# Patient Record
Sex: FEMALE | Race: WHITE | NOT HISPANIC OR LATINO | Employment: FULL TIME | URBAN - METROPOLITAN AREA
[De-identification: names, ages, dates, MRNs, and addresses within clinical notes are randomized per-mention and may not be internally consistent; named-entity substitution may affect disease eponyms.]

---

## 2021-06-28 ENCOUNTER — TELEPHONE (OUTPATIENT)
Dept: SCHEDULING | Facility: IMAGING CENTER | Age: 51
End: 2021-06-28

## 2021-06-30 ENCOUNTER — OFFICE VISIT (OUTPATIENT)
Dept: MEDICAL GROUP | Facility: MEDICAL CENTER | Age: 51
End: 2021-06-30
Payer: COMMERCIAL

## 2021-06-30 VITALS
TEMPERATURE: 98.5 F | OXYGEN SATURATION: 97 % | HEIGHT: 63 IN | WEIGHT: 163.36 LBS | RESPIRATION RATE: 18 BRPM | DIASTOLIC BLOOD PRESSURE: 66 MMHG | BODY MASS INDEX: 28.95 KG/M2 | HEART RATE: 95 BPM | SYSTOLIC BLOOD PRESSURE: 110 MMHG

## 2021-06-30 DIAGNOSIS — N39.3 STRESS INCONTINENCE: ICD-10-CM

## 2021-06-30 DIAGNOSIS — Z13.1 SCREENING FOR DIABETES MELLITUS: ICD-10-CM

## 2021-06-30 DIAGNOSIS — Z12.12 SCREENING FOR COLORECTAL CANCER: ICD-10-CM

## 2021-06-30 DIAGNOSIS — E53.8 FOLATE DEFICIENCY: ICD-10-CM

## 2021-06-30 DIAGNOSIS — Z13.220 ENCOUNTER FOR LIPID SCREENING FOR CARDIOVASCULAR DISEASE: ICD-10-CM

## 2021-06-30 DIAGNOSIS — Z13.21 ENCOUNTER FOR VITAMIN DEFICIENCY SCREENING: ICD-10-CM

## 2021-06-30 DIAGNOSIS — E53.9 VITAMIN B DEFICIENCY: ICD-10-CM

## 2021-06-30 DIAGNOSIS — Z13.6 ENCOUNTER FOR LIPID SCREENING FOR CARDIOVASCULAR DISEASE: ICD-10-CM

## 2021-06-30 DIAGNOSIS — Z12.11 SCREENING FOR COLORECTAL CANCER: ICD-10-CM

## 2021-06-30 DIAGNOSIS — N93.9 VAGINAL BLEEDING: ICD-10-CM

## 2021-06-30 PROCEDURE — 99204 OFFICE O/P NEW MOD 45 MIN: CPT | Performed by: FAMILY MEDICINE

## 2021-06-30 RX ORDER — TRANEXAMIC ACID 650 MG/1
TABLET ORAL
COMMUNITY
End: 2021-07-16

## 2021-06-30 ASSESSMENT — ENCOUNTER SYMPTOMS
COUGH: 0
FEVER: 0
CHILLS: 0
WEIGHT LOSS: 0
BLURRED VISION: 0
BRUISES/BLEEDS EASILY: 0
VOMITING: 0
NAUSEA: 0
ABDOMINAL PAIN: 0
SHORTNESS OF BREATH: 0
WEAKNESS: 0
MYALGIAS: 0
DIARRHEA: 0
DIZZINESS: 0
PALPITATIONS: 0
SORE THROAT: 0
DOUBLE VISION: 0
DEPRESSION: 0
TINGLING: 0
NERVOUS/ANXIOUS: 0
CONSTIPATION: 0

## 2021-06-30 ASSESSMENT — PATIENT HEALTH QUESTIONNAIRE - PHQ9: CLINICAL INTERPRETATION OF PHQ2 SCORE: 0

## 2021-06-30 NOTE — ASSESSMENT & PLAN NOTE
Patient reports that back in 2018 she had an endometrial ablation and had several uterine fibroids removed.  She was prescribed Lysteda at that time to help with uterine bleeding.  She [place] would like to get established with GYN and primary care.  She reports that starting on May 31 she noticed some light spotting that is now progressively worsened since that time, going through 2 pads in 24-hour period.  She denies any episodes of heavy bleeding, dizziness, nausea.  She has made an appointment for herself with a GYN here in town.

## 2021-06-30 NOTE — PROGRESS NOTES
Enriqueta Valdovinos is a pleasant 50 y.o. female here to establish care.    HPI:   Stress incontinence  Patient reports episodes of urinary incontinence when she experiences increase in pressure such as sneezing or coughing.  She states that these do not happen often but would just like to have this information updated in her chart.  She currently does not do Kegel exercises at home.    Vaginal bleeding  Patient reports that back in 2018 she had an endometrial ablation and had several uterine fibroids removed.  She was prescribed Lysteda at that time to help with uterine bleeding.  She [place] would like to get established with GYN and primary care.  She reports that starting on May 31 she noticed some light spotting that is now progressively worsened since that time, going through 2 pads in 24-hour period.  She denies any episodes of heavy bleeding, dizziness, nausea.  She has made an appointment for herself with a GYN here in [facility].     Vitamin B deficiency  She reports vitamin B 12 deficiency and was advised by her previous primary care provider to take vitamin B12 supplementation, folate, and niacin        Health Maintenance  Pap smear: October 2020  Mammogram: October 2020      Current medicines (including changes today)  Current Outpatient Medications   Medication Sig Dispense Refill   • Tranexamic Acid (LYSTEDA) 650 MG Tab Take  by mouth.     • B Complex Vitamins (VITAMIN-B COMPLEX PO) Take  by mouth. Broken down     • Amino Acids (AMINO ACID PO) Take  by mouth.     • VITAMIN D PO Take  by mouth.       No current facility-administered medications for this visit.       Past Medical/ Surgical History  She  has a past medical history of Abnormal Pap smear of vagina.  She  has a past surgical history that includes endometrial ablation (2018).    Social History  Social History     Tobacco Use   • Smoking status: Never Smoker   • Smokeless tobacco: Never Used   Vaping Use   • Vaping  "Use: Never used   Substance Use Topics   • Alcohol use: Yes     Comment: rare   • Drug use: Not Currently     Social History     Social History Narrative   • Not on file        Family History  Family History   Problem Relation Age of Onset   • Anxiety disorder Mother    • Cancer Father         bladder, hx of smoking   • No Known Problems Sister    • Obesity Daughter      Family Status   Relation Name Status   • Mo  Alive   • Fa     • Delicia Clark Alive   • Juany Byrd Alive         Review of Systems   Constitutional: Negative for chills, fever, malaise/fatigue and weight loss.   HENT: Negative for hearing loss and sore throat.    Eyes: Negative for blurred vision and double vision.   Respiratory: Negative for cough and shortness of breath.    Cardiovascular: Negative for chest pain, palpitations and leg swelling.   Gastrointestinal: Negative for abdominal pain, constipation, diarrhea, nausea and vomiting.   Musculoskeletal: Negative for myalgias.   Skin: Negative for rash.   Neurological: Negative for dizziness, tingling and weakness.   Endo/Heme/Allergies: Does not bruise/bleed easily.   Psychiatric/Behavioral: Negative for depression. The patient is not nervous/anxious.          Objective:     /66 (BP Location: Right arm, Patient Position: Sitting, BP Cuff Size: Adult)   Pulse 95   Temp 36.9 °C (98.5 °F) (Temporal)   Resp 18   Ht 1.6 m (5' 3\")   Wt 74.1 kg (163 lb 5.8 oz)   SpO2 97%  Body mass index is 28.94 kg/m².    Physical Exam  Constitutional:       General: She is not in acute distress.  HENT:      Head: Normocephalic and atraumatic.      Right Ear: External ear normal.      Left Ear: External ear normal.   Eyes:      Conjunctiva/sclera: Conjunctivae normal.      Pupils: Pupils are equal, round, and reactive to light.   Cardiovascular:      Rate and Rhythm: Normal rate and regular rhythm.      Heart sounds: No murmur heard.   No friction rub. No gallop.    Pulmonary:      Effort: Pulmonary " effort is normal.      Breath sounds: Normal breath sounds. No wheezing or rales.   Abdominal:      General: Bowel sounds are normal.      Palpations: Abdomen is soft.      Tenderness: There is no abdominal tenderness.   Musculoskeletal:      Cervical back: Normal range of motion and neck supple.      Right lower leg: No edema.      Left lower leg: No edema.   Skin:     General: Skin is warm and dry.      Findings: No rash.   Neurological:      Mental Status: She is alert and oriented to person, place, and time.      Gait: Gait is intact.   Psychiatric:         Mood and Affect: Affect normal.        Imaging:  No recent imaging to review    Labs:  No recent blood work to review  Assessment and Plan:   The following treatment plan was discussed    1. Vaginal bleeding  New to the patient.  We will go ahead and obtain a CBC to check patient's blood levels as well as clotting factors.  We will also get a pelvic ultrasound along with a referral to GYN for further evaluation of her abnormal uterine bleeding.  - REFERRAL TO GYNECOLOGY  - US-PELVIC COMPLETE (TRANSABDOMINAL/TRANSVAGINAL) (COMBO); Future  - CBC WITH DIFFERENTIAL; Future    2. Stress incontinence  Chronic.  I did recommend that the patient complete Kegel exercises to help strengthen her pelvic floor.    3. Vitamin B deficiency  Patient reports a history of vitamin B12 deficiency, she would like to have blood testing to see what her levels are.  - VITAMIN B12; Future    4. Folate deficiency  Patient reports a history of folate deficiency, she would like to have blood testing to see what her levels are.  - FOLATE; Future    5. Screening for colorectal cancer  Patient is due for her colorectal cancer screening.  Referral placed  - REFERRAL TO GI FOR COLONOSCOPY    6. Encounter for vitamin deficiency screening  Patient would like to have their blood work done, no recent blood work completed.  Orders placed.  - VITAMIN D,25 HYDROXY; Future    7. Encounter for lipid  screening for cardiovascular disease  Patient would like to have their blood work done, no recent blood work completed.  Orders placed.  - Lipid Profile; Future    8. Screening for diabetes mellitus  Patient would like to have their blood work done, no recent blood work completed.  Orders placed.  - Comp Metabolic Panel; Future  - ESTIMATED GFR; Future      Records requested.  Followup: Return in about 4 weeks (around 7/28/2021) for blood work.    Please note that this dictation was created using voice recognition software. I have made every reasonable attempt to correct obvious errors, but I expect that there are errors of grammar and possibly content that I did not discover before finalizing the note.

## 2021-06-30 NOTE — ASSESSMENT & PLAN NOTE
Patient reports episodes of urinary incontinence when she experiences increase in pressure such as sneezing or coughing.  She states that these do not happen often but would just like to have this information updated in her chart.  She currently does not do Kegel exercises at home.

## 2021-06-30 NOTE — ASSESSMENT & PLAN NOTE
She reports vitamin B 12 deficiency and was advised by her previous primary care provider to take vitamin B12 supplementation, folate, and niacin

## 2021-07-06 ENCOUNTER — HOSPITAL ENCOUNTER (OUTPATIENT)
Dept: LAB | Facility: MEDICAL CENTER | Age: 51
End: 2021-07-06
Attending: FAMILY MEDICINE
Payer: COMMERCIAL

## 2021-07-06 DIAGNOSIS — Z13.220 ENCOUNTER FOR LIPID SCREENING FOR CARDIOVASCULAR DISEASE: ICD-10-CM

## 2021-07-06 DIAGNOSIS — Z13.21 ENCOUNTER FOR VITAMIN DEFICIENCY SCREENING: ICD-10-CM

## 2021-07-06 DIAGNOSIS — Z13.1 SCREENING FOR DIABETES MELLITUS: ICD-10-CM

## 2021-07-06 DIAGNOSIS — E53.9 VITAMIN B DEFICIENCY: ICD-10-CM

## 2021-07-06 DIAGNOSIS — Z13.6 ENCOUNTER FOR LIPID SCREENING FOR CARDIOVASCULAR DISEASE: ICD-10-CM

## 2021-07-06 DIAGNOSIS — N93.9 VAGINAL BLEEDING: ICD-10-CM

## 2021-07-06 DIAGNOSIS — E53.8 FOLATE DEFICIENCY: ICD-10-CM

## 2021-07-06 LAB
25(OH)D3 SERPL-MCNC: 41 NG/ML (ref 30–100)
ALBUMIN SERPL BCP-MCNC: 4.2 G/DL (ref 3.2–4.9)
ALBUMIN/GLOB SERPL: 1.6 G/DL
ALP SERPL-CCNC: 69 U/L (ref 30–99)
ALT SERPL-CCNC: 19 U/L (ref 2–50)
ANION GAP SERPL CALC-SCNC: 8 MMOL/L (ref 7–16)
AST SERPL-CCNC: 22 U/L (ref 12–45)
BASOPHILS # BLD AUTO: 0.8 % (ref 0–1.8)
BASOPHILS # BLD: 0.04 K/UL (ref 0–0.12)
BILIRUB SERPL-MCNC: 0.6 MG/DL (ref 0.1–1.5)
BUN SERPL-MCNC: 11 MG/DL (ref 8–22)
CALCIUM SERPL-MCNC: 9 MG/DL (ref 8.4–10.2)
CHLORIDE SERPL-SCNC: 102 MMOL/L (ref 96–112)
CHOLEST SERPL-MCNC: 196 MG/DL (ref 100–199)
CO2 SERPL-SCNC: 27 MMOL/L (ref 20–33)
CREAT SERPL-MCNC: 0.83 MG/DL (ref 0.5–1.4)
EOSINOPHIL # BLD AUTO: 0.21 K/UL (ref 0–0.51)
EOSINOPHIL NFR BLD: 4.3 % (ref 0–6.9)
ERYTHROCYTE [DISTWIDTH] IN BLOOD BY AUTOMATED COUNT: 39.3 FL (ref 35.9–50)
FASTING STATUS PATIENT QL REPORTED: NORMAL
FOLATE SERPL-MCNC: 18.6 NG/ML
GLOBULIN SER CALC-MCNC: 2.7 G/DL (ref 1.9–3.5)
GLUCOSE SERPL-MCNC: 103 MG/DL (ref 65–99)
HCT VFR BLD AUTO: 40.7 % (ref 37–47)
HDLC SERPL-MCNC: 53 MG/DL
HGB BLD-MCNC: 14.2 G/DL (ref 12–16)
IMM GRANULOCYTES # BLD AUTO: 0.01 K/UL (ref 0–0.11)
IMM GRANULOCYTES NFR BLD AUTO: 0.2 % (ref 0–0.9)
LDLC SERPL CALC-MCNC: 94 MG/DL
LYMPHOCYTES # BLD AUTO: 1.5 K/UL (ref 1–4.8)
LYMPHOCYTES NFR BLD: 30.4 % (ref 22–41)
MCH RBC QN AUTO: 31.3 PG (ref 27–33)
MCHC RBC AUTO-ENTMCNC: 34.9 G/DL (ref 33.6–35)
MCV RBC AUTO: 89.8 FL (ref 81.4–97.8)
MONOCYTES # BLD AUTO: 0.34 K/UL (ref 0–0.85)
MONOCYTES NFR BLD AUTO: 6.9 % (ref 0–13.4)
NEUTROPHILS # BLD AUTO: 2.84 K/UL (ref 2–7.15)
NEUTROPHILS NFR BLD: 57.4 % (ref 44–72)
NRBC # BLD AUTO: 0 K/UL
NRBC BLD-RTO: 0 /100 WBC
PLATELET # BLD AUTO: 271 K/UL (ref 164–446)
PMV BLD AUTO: 9.4 FL (ref 9–12.9)
POTASSIUM SERPL-SCNC: 4.2 MMOL/L (ref 3.6–5.5)
PROT SERPL-MCNC: 6.9 G/DL (ref 6–8.2)
RBC # BLD AUTO: 4.53 M/UL (ref 4.2–5.4)
SODIUM SERPL-SCNC: 137 MMOL/L (ref 135–145)
TRIGL SERPL-MCNC: 243 MG/DL (ref 0–149)
VIT B12 SERPL-MCNC: 837 PG/ML (ref 211–911)
WBC # BLD AUTO: 4.9 K/UL (ref 4.8–10.8)

## 2021-07-06 PROCEDURE — 85025 COMPLETE CBC W/AUTO DIFF WBC: CPT

## 2021-07-06 PROCEDURE — 82607 VITAMIN B-12: CPT

## 2021-07-06 PROCEDURE — 80053 COMPREHEN METABOLIC PANEL: CPT

## 2021-07-06 PROCEDURE — 82306 VITAMIN D 25 HYDROXY: CPT

## 2021-07-06 PROCEDURE — 36415 COLL VENOUS BLD VENIPUNCTURE: CPT

## 2021-07-06 PROCEDURE — 82746 ASSAY OF FOLIC ACID SERUM: CPT

## 2021-07-06 PROCEDURE — 80061 LIPID PANEL: CPT

## 2021-07-07 ENCOUNTER — APPOINTMENT (OUTPATIENT)
Dept: RADIOLOGY | Facility: MEDICAL CENTER | Age: 51
End: 2021-07-07
Attending: FAMILY MEDICINE
Payer: COMMERCIAL

## 2021-07-16 ENCOUNTER — OFFICE VISIT (OUTPATIENT)
Dept: MEDICAL GROUP | Facility: PHYSICIAN GROUP | Age: 51
End: 2021-07-16
Payer: COMMERCIAL

## 2021-07-16 VITALS
OXYGEN SATURATION: 97 % | HEIGHT: 63 IN | WEIGHT: 160 LBS | HEART RATE: 85 BPM | TEMPERATURE: 98.2 F | BODY MASS INDEX: 28.35 KG/M2 | SYSTOLIC BLOOD PRESSURE: 114 MMHG | DIASTOLIC BLOOD PRESSURE: 72 MMHG

## 2021-07-16 DIAGNOSIS — G47.33 OSA (OBSTRUCTIVE SLEEP APNEA): ICD-10-CM

## 2021-07-16 DIAGNOSIS — R73.01 ELEVATED FASTING GLUCOSE: ICD-10-CM

## 2021-07-16 DIAGNOSIS — E78.1 HYPERTRIGLYCERIDEMIA: ICD-10-CM

## 2021-07-16 DIAGNOSIS — N93.9 VAGINAL BLEEDING: ICD-10-CM

## 2021-07-16 DIAGNOSIS — E53.8 FOLATE DEFICIENCY: ICD-10-CM

## 2021-07-16 DIAGNOSIS — Z86.32 HISTORY OF GESTATIONAL DIABETES: ICD-10-CM

## 2021-07-16 PROBLEM — K90.49 MILK PROTEIN INTOLERANCE: Status: ACTIVE | Noted: 2021-07-16

## 2021-07-16 PROBLEM — E55.9 VITAMIN D DEFICIENCY: Status: ACTIVE | Noted: 2020-07-20

## 2021-07-16 PROBLEM — Z15.89 HETEROZYGOUS MTHFR MUTATION C677T: Status: ACTIVE | Noted: 2020-07-23

## 2021-07-16 PROCEDURE — 99214 OFFICE O/P EST MOD 30 MIN: CPT | Performed by: FAMILY MEDICINE

## 2021-07-16 ASSESSMENT — FIBROSIS 4 INDEX: FIB4 SCORE: 0.95

## 2021-07-16 NOTE — PROGRESS NOTES
CC: Lab review    HISTORY OF THE PRESENT ILLNESS: Patient is a 51 y.o. female.     Patient is here to establish care.    She notes that she recently had lab work done and would like to review her lab results.  Overall everything looked excellent with the exception of elevated triglycerides and mildly elevated fasting sugar.    She does note that prior to having her labs completed, she did partake in foods and drink that she normally does not such as soda and alcohol, as it was her birthday and Fourth of July weekend.    She does note history of elevated fasting sugar as well in the past, and she does have a history of gestational diabetes.    She notes she is awaiting a referral to gynecology.  She has a history of heavy vaginal bleeding and abnormal Pap smears.  She also has a history of endometrial ablation in 2018.    She notes history of obstructive sleep apnea though she did not tolerate CPAP machine.  She has since lost weight and feels rested when she wakes up in the morning.  Denies any headaches upon awakening.    Allergies: Patient has no known allergies.    Current Outpatient Medications Ordered in Epic   Medication Sig Dispense Refill   • B Complex Vitamins (VITAMIN-B COMPLEX PO) Take  by mouth. Broken down     • Amino Acids (AMINO ACID PO) Take  by mouth.     • VITAMIN D PO Take  by mouth.       No current Epic-ordered facility-administered medications on file.       Past Medical History:   Diagnosis Date   • Abnormal Pap smear of vagina        Past Surgical History:   Procedure Laterality Date   • ENDOMETRIAL ABLATION  2018       Social History     Tobacco Use   • Smoking status: Never Smoker   • Smokeless tobacco: Never Used   Vaping Use   • Vaping Use: Never used   Substance Use Topics   • Alcohol use: Yes     Comment: rare   • Drug use: Not Currently       Social History     Social History Narrative   • Not on file       Family History   Problem Relation Age of Onset   • Anxiety disorder Mother    •  "Other Mother         PAD with stent placement   • Cancer Father         bladder, hx of smoking   • Hyperlipidemia Sister    • Obesity Daughter        ROS:   Denies fever, chest pain, shortness of breath.  Denies bowel or bladder concerns.      Labs: Labs reviewed from July 6, 2021.  Outside labs also reviewed from prior physician (see scanned under media on today's date).    Exam: /72 (BP Location: Right arm, Patient Position: Sitting, BP Cuff Size: Adult)   Pulse 85   Temp 36.8 °C (98.2 °F) (Temporal)   Ht 1.6 m (5' 3\")   Wt 72.6 kg (160 lb)   SpO2 97%  Body mass index is 28.34 kg/m².    General: Well appearing, NAD  HEENT: Normocephalic. Conjunctiva clear, lids without ptosis, pupils equal and reactive to light accommodation, ears normal shape and contour, canals are clear bilaterally, tympanic membranes without bulging or erythema and normal cone of light  Neck: Supple without JVD. No thyromegaly or nodules  Pulmonary: Clear to ausculation.  Normal effort. No rales, ronchi, or wheezing.  Cardiovascular: Regular rate and rhythm without murmur, rubs or gallop.   Abdomen: Soft, nontender, nondistended. Normal bowel sounds. Liver and spleen are not palpable. No rebound or guarding  Neurologic: normal gait  Lymph: No cervical, supraclavicular  lymph nodes are palpable  Skin: Warm and dry.  No obvious lesions.  Musculoskeletal:  No extremity cyanosis, clubbing, or edema.  Psych: Normal mood and affect. Alert and oriented. Judgment and insight is normal.    Please note that this dictation was created using voice recognition software. I have made every reasonable attempt to correct obvious errors, but I expect that there are errors of grammar and possibly content that I did not discover before finalizing the note.      Assessment/Plan  Enriqueta was seen today for establish care and lab results.    Diagnoses and all orders for this visit:    Vaginal bleeding  As above, awaiting consultation from gynecology.  " Recent CBC did not show anemia.    Folate deficiency  Folate on most recent lab work was within normal limits.  Continue current supplementation.    Elevated fasting glucose  History of gestational diabetes  Recent fasting glucose 103, mild.  Recommend hemoglobin A1c check at least once yearly.    LOUIS (obstructive sleep apnea)  Since this diagnosis, patient has lost weight.  She also notes that she wakes up feeling well rested.  She did not tolerate CPAP.    Hypertriglyceridemia  Noted on recent lab work though LDL, HDL and total cholesterol within normal limits.  Likely secondary to dietary intake on days leading up to lab work.  Will monitor.    Follow-up in 1 year sooner if needed.    Haydee Cortés,   [facility]

## 2021-08-04 ENCOUNTER — HOSPITAL ENCOUNTER (OUTPATIENT)
Dept: RADIOLOGY | Facility: MEDICAL CENTER | Age: 51
End: 2021-08-04
Attending: FAMILY MEDICINE
Payer: COMMERCIAL

## 2021-08-04 DIAGNOSIS — N93.9 VAGINAL BLEEDING: ICD-10-CM

## 2021-08-04 PROCEDURE — 76830 TRANSVAGINAL US NON-OB: CPT

## 2021-10-22 ENCOUNTER — HOSPITAL ENCOUNTER (OUTPATIENT)
Dept: LAB | Facility: MEDICAL CENTER | Age: 51
End: 2021-10-22
Attending: FAMILY MEDICINE
Payer: COMMERCIAL

## 2021-10-22 ENCOUNTER — HOSPITAL ENCOUNTER (OUTPATIENT)
Dept: LAB | Facility: MEDICAL CENTER | Age: 51
End: 2021-10-22
Attending: OBSTETRICS & GYNECOLOGY
Payer: COMMERCIAL

## 2021-10-22 ENCOUNTER — TELEPHONE (OUTPATIENT)
Dept: MEDICAL GROUP | Facility: PHYSICIAN GROUP | Age: 51
End: 2021-10-22

## 2021-10-22 DIAGNOSIS — N92.4 EXCESSIVE BLEEDING IN PREMENOPAUSAL PERIOD: ICD-10-CM

## 2021-10-22 LAB
BASOPHILS # BLD AUTO: 0.5 % (ref 0–1.8)
BASOPHILS # BLD: 0.03 K/UL (ref 0–0.12)
EOSINOPHIL # BLD AUTO: 0.2 K/UL (ref 0–0.51)
EOSINOPHIL NFR BLD: 3.6 % (ref 0–6.9)
ERYTHROCYTE [DISTWIDTH] IN BLOOD BY AUTOMATED COUNT: 39.1 FL (ref 35.9–50)
FERRITIN SERPL-MCNC: 180 NG/ML (ref 10–291)
HCT VFR BLD AUTO: 45.1 % (ref 37–47)
HGB BLD-MCNC: 15.4 G/DL (ref 12–16)
IMM GRANULOCYTES # BLD AUTO: 0.01 K/UL (ref 0–0.11)
IMM GRANULOCYTES NFR BLD AUTO: 0.2 % (ref 0–0.9)
IRON SATN MFR SERPL: 22 % (ref 15–55)
IRON SERPL-MCNC: 68 UG/DL (ref 40–170)
LYMPHOCYTES # BLD AUTO: 1.38 K/UL (ref 1–4.8)
LYMPHOCYTES NFR BLD: 25.1 % (ref 22–41)
MCH RBC QN AUTO: 30.6 PG (ref 27–33)
MCHC RBC AUTO-ENTMCNC: 34.1 G/DL (ref 33.6–35)
MCV RBC AUTO: 89.5 FL (ref 81.4–97.8)
MONOCYTES # BLD AUTO: 0.36 K/UL (ref 0–0.85)
MONOCYTES NFR BLD AUTO: 6.6 % (ref 0–13.4)
NEUTROPHILS # BLD AUTO: 3.51 K/UL (ref 2–7.15)
NEUTROPHILS NFR BLD: 64 % (ref 44–72)
NRBC # BLD AUTO: 0 K/UL
NRBC BLD-RTO: 0 /100 WBC
PLATELET # BLD AUTO: 266 K/UL (ref 164–446)
PMV BLD AUTO: 9.2 FL (ref 9–12.9)
RBC # BLD AUTO: 5.04 M/UL (ref 4.2–5.4)
TIBC SERPL-MCNC: 304 UG/DL (ref 250–450)
UIBC SERPL-MCNC: 236 UG/DL (ref 110–370)
WBC # BLD AUTO: 5.5 K/UL (ref 4.8–10.8)

## 2021-10-22 PROCEDURE — 83540 ASSAY OF IRON: CPT

## 2021-10-22 PROCEDURE — 85025 COMPLETE CBC W/AUTO DIFF WBC: CPT

## 2021-10-22 PROCEDURE — 36415 COLL VENOUS BLD VENIPUNCTURE: CPT

## 2021-10-22 PROCEDURE — 82728 ASSAY OF FERRITIN: CPT

## 2021-10-22 PROCEDURE — 83001 ASSAY OF GONADOTROPIN (FSH): CPT

## 2021-10-22 PROCEDURE — 83550 IRON BINDING TEST: CPT

## 2021-10-23 LAB — FSH SERPL-ACNC: 62.1 MIU/ML

## 2022-07-26 ENCOUNTER — TELEPHONE (OUTPATIENT)
Dept: HEALTH INFORMATION MANAGEMENT | Facility: OTHER | Age: 52
End: 2022-07-26

## 2022-07-26 PROBLEM — Z00.00 PREVENTATIVE HEALTH CARE: Status: ACTIVE | Noted: 2022-07-26

## 2022-07-26 SDOH — ECONOMIC STABILITY: TRANSPORTATION INSECURITY
IN THE PAST 12 MONTHS, HAS THE LACK OF TRANSPORTATION KEPT YOU FROM MEDICAL APPOINTMENTS OR FROM GETTING MEDICATIONS?: PATIENT DECLINED

## 2022-07-26 SDOH — HEALTH STABILITY: PHYSICAL HEALTH: ON AVERAGE, HOW MANY MINUTES DO YOU ENGAGE IN EXERCISE AT THIS LEVEL?: 150+ MIN

## 2022-07-26 SDOH — ECONOMIC STABILITY: FOOD INSECURITY: WITHIN THE PAST 12 MONTHS, THE FOOD YOU BOUGHT JUST DIDN'T LAST AND YOU DIDN'T HAVE MONEY TO GET MORE.: PATIENT DECLINED

## 2022-07-26 SDOH — ECONOMIC STABILITY: FOOD INSECURITY: WITHIN THE PAST 12 MONTHS, YOU WORRIED THAT YOUR FOOD WOULD RUN OUT BEFORE YOU GOT MONEY TO BUY MORE.: PATIENT DECLINED

## 2022-07-26 SDOH — HEALTH STABILITY: PHYSICAL HEALTH: ON AVERAGE, HOW MANY DAYS PER WEEK DO YOU ENGAGE IN MODERATE TO STRENUOUS EXERCISE (LIKE A BRISK WALK)?: 1 DAY

## 2022-07-26 SDOH — ECONOMIC STABILITY: INCOME INSECURITY: IN THE LAST 12 MONTHS, WAS THERE A TIME WHEN YOU WERE NOT ABLE TO PAY THE MORTGAGE OR RENT ON TIME?: PATIENT REFUSED

## 2022-07-26 SDOH — ECONOMIC STABILITY: HOUSING INSECURITY
IN THE LAST 12 MONTHS, WAS THERE A TIME WHEN YOU DID NOT HAVE A STEADY PLACE TO SLEEP OR SLEPT IN A SHELTER (INCLUDING NOW)?: PATIENT REFUSED

## 2022-07-26 SDOH — ECONOMIC STABILITY: INCOME INSECURITY: HOW HARD IS IT FOR YOU TO PAY FOR THE VERY BASICS LIKE FOOD, HOUSING, MEDICAL CARE, AND HEATING?: PATIENT DECLINED

## 2022-07-26 SDOH — ECONOMIC STABILITY: TRANSPORTATION INSECURITY
IN THE PAST 12 MONTHS, HAS LACK OF RELIABLE TRANSPORTATION KEPT YOU FROM MEDICAL APPOINTMENTS, MEETINGS, WORK OR FROM GETTING THINGS NEEDED FOR DAILY LIVING?: PATIENT DECLINED

## 2022-07-26 SDOH — ECONOMIC STABILITY: HOUSING INSECURITY

## 2022-07-26 SDOH — HEALTH STABILITY: MENTAL HEALTH
STRESS IS WHEN SOMEONE FEELS TENSE, NERVOUS, ANXIOUS, OR CAN'T SLEEP AT NIGHT BECAUSE THEIR MIND IS TROUBLED. HOW STRESSED ARE YOU?: NOT AT ALL

## 2022-07-26 SDOH — ECONOMIC STABILITY: TRANSPORTATION INSECURITY
IN THE PAST 12 MONTHS, HAS LACK OF TRANSPORTATION KEPT YOU FROM MEETINGS, WORK, OR FROM GETTING THINGS NEEDED FOR DAILY LIVING?: PATIENT DECLINED

## 2022-07-26 ASSESSMENT — LIFESTYLE VARIABLES
HOW OFTEN DO YOU HAVE A DRINK CONTAINING ALCOHOL: PATIENT DECLINED
AUDIT-C TOTAL SCORE: -1
HOW MANY STANDARD DRINKS CONTAINING ALCOHOL DO YOU HAVE ON A TYPICAL DAY: PATIENT DECLINED
HOW OFTEN DO YOU HAVE SIX OR MORE DRINKS ON ONE OCCASION: PATIENT DECLINED
SKIP TO QUESTIONS 9-10: 0

## 2022-07-26 ASSESSMENT — SOCIAL DETERMINANTS OF HEALTH (SDOH)
ARE YOU MARRIED, WIDOWED, DIVORCED, SEPARATED, NEVER MARRIED, OR LIVING WITH A PARTNER?: PATIENT DECLINED
DO YOU BELONG TO ANY CLUBS OR ORGANIZATIONS SUCH AS CHURCH GROUPS UNIONS, FRATERNAL OR ATHLETIC GROUPS, OR SCHOOL GROUPS?: PATIENT DECLINED
HOW MANY DRINKS CONTAINING ALCOHOL DO YOU HAVE ON A TYPICAL DAY WHEN YOU ARE DRINKING: PATIENT DECLINED
IN A TYPICAL WEEK, HOW MANY TIMES DO YOU TALK ON THE PHONE WITH FAMILY, FRIENDS, OR NEIGHBORS?: PATIENT DECLINED
HOW OFTEN DO YOU ATTEND CHURCH OR RELIGIOUS SERVICES?: PATIENT DECLINED
WITHIN THE PAST 12 MONTHS, YOU WORRIED THAT YOUR FOOD WOULD RUN OUT BEFORE YOU GOT THE MONEY TO BUY MORE: PATIENT DECLINED
HOW OFTEN DO YOU ATTENT MEETINGS OF THE CLUB OR ORGANIZATION YOU BELONG TO?: PATIENT DECLINED
DO YOU BELONG TO ANY CLUBS OR ORGANIZATIONS SUCH AS CHURCH GROUPS UNIONS, FRATERNAL OR ATHLETIC GROUPS, OR SCHOOL GROUPS?: PATIENT DECLINED
HOW OFTEN DO YOU HAVE A DRINK CONTAINING ALCOHOL: PATIENT DECLINED
HOW OFTEN DO YOU HAVE SIX OR MORE DRINKS ON ONE OCCASION: PATIENT DECLINED
IN A TYPICAL WEEK, HOW MANY TIMES DO YOU TALK ON THE PHONE WITH FAMILY, FRIENDS, OR NEIGHBORS?: PATIENT DECLINED
HOW OFTEN DO YOU ATTENT MEETINGS OF THE CLUB OR ORGANIZATION YOU BELONG TO?: PATIENT DECLINED
ARE YOU MARRIED, WIDOWED, DIVORCED, SEPARATED, NEVER MARRIED, OR LIVING WITH A PARTNER?: PATIENT DECLINED
HOW OFTEN DO YOU GET TOGETHER WITH FRIENDS OR RELATIVES?: PATIENT DECLINED
HOW OFTEN DO YOU GET TOGETHER WITH FRIENDS OR RELATIVES?: PATIENT DECLINED
HOW HARD IS IT FOR YOU TO PAY FOR THE VERY BASICS LIKE FOOD, HOUSING, MEDICAL CARE, AND HEATING?: PATIENT DECLINED
HOW OFTEN DO YOU ATTEND CHURCH OR RELIGIOUS SERVICES?: PATIENT DECLINED

## 2022-07-27 ENCOUNTER — HOSPITAL ENCOUNTER (OUTPATIENT)
Dept: LAB | Facility: MEDICAL CENTER | Age: 52
End: 2022-07-27
Attending: STUDENT IN AN ORGANIZED HEALTH CARE EDUCATION/TRAINING PROGRAM
Payer: COMMERCIAL

## 2022-07-27 ENCOUNTER — OFFICE VISIT (OUTPATIENT)
Dept: MEDICAL GROUP | Facility: MEDICAL CENTER | Age: 52
End: 2022-07-27
Payer: COMMERCIAL

## 2022-07-27 ENCOUNTER — RESEARCH ENCOUNTER (OUTPATIENT)
Dept: MEDICAL GROUP | Facility: PHYSICIAN GROUP | Age: 52
End: 2022-07-27

## 2022-07-27 VITALS
RESPIRATION RATE: 16 BRPM | OXYGEN SATURATION: 98 % | HEIGHT: 63 IN | TEMPERATURE: 97.9 F | DIASTOLIC BLOOD PRESSURE: 68 MMHG | WEIGHT: 170.4 LBS | HEART RATE: 78 BPM | BODY MASS INDEX: 30.19 KG/M2 | SYSTOLIC BLOOD PRESSURE: 122 MMHG

## 2022-07-27 DIAGNOSIS — E55.9 VITAMIN D DEFICIENCY: ICD-10-CM

## 2022-07-27 DIAGNOSIS — Z00.00 PREVENTATIVE HEALTH CARE: ICD-10-CM

## 2022-07-27 DIAGNOSIS — E53.9 VITAMIN B DEFICIENCY: ICD-10-CM

## 2022-07-27 DIAGNOSIS — R19.8 ALTERNATING CONSTIPATION AND DIARRHEA: ICD-10-CM

## 2022-07-27 DIAGNOSIS — Z11.59 NEED FOR HEPATITIS C SCREENING TEST: ICD-10-CM

## 2022-07-27 DIAGNOSIS — Z00.6 RESEARCH STUDY PATIENT: ICD-10-CM

## 2022-07-27 DIAGNOSIS — Z11.4 SCREENING FOR HIV (HUMAN IMMUNODEFICIENCY VIRUS): ICD-10-CM

## 2022-07-27 DIAGNOSIS — E53.8 FOLATE DEFICIENCY: ICD-10-CM

## 2022-07-27 DIAGNOSIS — Z15.89 HETEROZYGOUS MTHFR MUTATION C677T: ICD-10-CM

## 2022-07-27 DIAGNOSIS — E78.1 HYPERTRIGLYCERIDEMIA: ICD-10-CM

## 2022-07-27 DIAGNOSIS — R68.82 DECREASED LIBIDO: ICD-10-CM

## 2022-07-27 DIAGNOSIS — Z76.89 ENCOUNTER TO ESTABLISH CARE WITH NEW DOCTOR: ICD-10-CM

## 2022-07-27 DIAGNOSIS — Z12.11 COLON CANCER SCREENING: ICD-10-CM

## 2022-07-27 LAB
25(OH)D3 SERPL-MCNC: 55 NG/ML (ref 30–100)
ALBUMIN SERPL BCP-MCNC: 4.8 G/DL (ref 3.2–4.9)
ALBUMIN/GLOB SERPL: 1.8 G/DL
ALP SERPL-CCNC: 73 U/L (ref 30–99)
ALT SERPL-CCNC: 37 U/L (ref 2–50)
ANION GAP SERPL CALC-SCNC: 12 MMOL/L (ref 7–16)
AST SERPL-CCNC: 29 U/L (ref 12–45)
BASOPHILS # BLD AUTO: 0.6 % (ref 0–1.8)
BASOPHILS # BLD: 0.03 K/UL (ref 0–0.12)
BILIRUB SERPL-MCNC: 0.8 MG/DL (ref 0.1–1.5)
BUN SERPL-MCNC: 16 MG/DL (ref 8–22)
CALCIUM SERPL-MCNC: 9.2 MG/DL (ref 8.4–10.2)
CHLORIDE SERPL-SCNC: 100 MMOL/L (ref 96–112)
CHOLEST SERPL-MCNC: 200 MG/DL (ref 100–199)
CO2 SERPL-SCNC: 26 MMOL/L (ref 20–33)
CREAT SERPL-MCNC: 0.77 MG/DL (ref 0.5–1.4)
EOSINOPHIL # BLD AUTO: 0.13 K/UL (ref 0–0.51)
EOSINOPHIL NFR BLD: 2.7 % (ref 0–6.9)
ERYTHROCYTE [DISTWIDTH] IN BLOOD BY AUTOMATED COUNT: 39.4 FL (ref 35.9–50)
EST. AVERAGE GLUCOSE BLD GHB EST-MCNC: 85 MG/DL
FASTING STATUS PATIENT QL REPORTED: NORMAL
GFR SERPLBLD CREATININE-BSD FMLA CKD-EPI: 93 ML/MIN/1.73 M 2
GLOBULIN SER CALC-MCNC: 2.6 G/DL (ref 1.9–3.5)
GLUCOSE SERPL-MCNC: 101 MG/DL (ref 65–99)
HBA1C MFR BLD: 4.6 % (ref 4–5.6)
HCT VFR BLD AUTO: 43.4 % (ref 37–47)
HCV AB SER QL: NORMAL
HDLC SERPL-MCNC: 52 MG/DL
HGB BLD-MCNC: 15.4 G/DL (ref 12–16)
HIV 1+2 AB+HIV1 P24 AG SERPL QL IA: NORMAL
IMM GRANULOCYTES # BLD AUTO: 0.01 K/UL (ref 0–0.11)
IMM GRANULOCYTES NFR BLD AUTO: 0.2 % (ref 0–0.9)
LDLC SERPL CALC-MCNC: 108 MG/DL
LYMPHOCYTES # BLD AUTO: 1.16 K/UL (ref 1–4.8)
LYMPHOCYTES NFR BLD: 24.3 % (ref 22–41)
MCH RBC QN AUTO: 31.7 PG (ref 27–33)
MCHC RBC AUTO-ENTMCNC: 35.5 G/DL (ref 33.6–35)
MCV RBC AUTO: 89.3 FL (ref 81.4–97.8)
MONOCYTES # BLD AUTO: 0.38 K/UL (ref 0–0.85)
MONOCYTES NFR BLD AUTO: 8 % (ref 0–13.4)
NEUTROPHILS # BLD AUTO: 3.06 K/UL (ref 2–7.15)
NEUTROPHILS NFR BLD: 64.2 % (ref 44–72)
NRBC # BLD AUTO: 0 K/UL
NRBC BLD-RTO: 0 /100 WBC
PLATELET # BLD AUTO: 256 K/UL (ref 164–446)
PMV BLD AUTO: 9.5 FL (ref 9–12.9)
POTASSIUM SERPL-SCNC: 4 MMOL/L (ref 3.6–5.5)
PROT SERPL-MCNC: 7.4 G/DL (ref 6–8.2)
RBC # BLD AUTO: 4.86 M/UL (ref 4.2–5.4)
SODIUM SERPL-SCNC: 138 MMOL/L (ref 135–145)
TRIGL SERPL-MCNC: 198 MG/DL (ref 0–149)
TSH SERPL DL<=0.005 MIU/L-ACNC: 1.27 UIU/ML (ref 0.38–5.33)
VIT B12 SERPL-MCNC: 1816 PG/ML (ref 211–911)
WBC # BLD AUTO: 4.8 K/UL (ref 4.8–10.8)

## 2022-07-27 PROCEDURE — 82306 VITAMIN D 25 HYDROXY: CPT

## 2022-07-27 PROCEDURE — 84443 ASSAY THYROID STIM HORMONE: CPT

## 2022-07-27 PROCEDURE — 83036 HEMOGLOBIN GLYCOSYLATED A1C: CPT

## 2022-07-27 PROCEDURE — 82746 ASSAY OF FOLIC ACID SERUM: CPT

## 2022-07-27 PROCEDURE — 84207 ASSAY OF VITAMIN B-6: CPT

## 2022-07-27 PROCEDURE — 87389 HIV-1 AG W/HIV-1&-2 AB AG IA: CPT

## 2022-07-27 PROCEDURE — 85025 COMPLETE CBC W/AUTO DIFF WBC: CPT

## 2022-07-27 PROCEDURE — 82607 VITAMIN B-12: CPT

## 2022-07-27 PROCEDURE — 80053 COMPREHEN METABOLIC PANEL: CPT

## 2022-07-27 PROCEDURE — 84425 ASSAY OF VITAMIN B-1: CPT

## 2022-07-27 PROCEDURE — 99214 OFFICE O/P EST MOD 30 MIN: CPT | Performed by: STUDENT IN AN ORGANIZED HEALTH CARE EDUCATION/TRAINING PROGRAM

## 2022-07-27 PROCEDURE — 36415 COLL VENOUS BLD VENIPUNCTURE: CPT

## 2022-07-27 PROCEDURE — 80061 LIPID PANEL: CPT

## 2022-07-27 PROCEDURE — 84591 ASSAY OF NOS VITAMIN: CPT

## 2022-07-27 PROCEDURE — 86803 HEPATITIS C AB TEST: CPT

## 2022-07-27 ASSESSMENT — FIBROSIS 4 INDEX: FIB4 SCORE: 0.99

## 2022-07-27 ASSESSMENT — PATIENT HEALTH QUESTIONNAIRE - PHQ9: CLINICAL INTERPRETATION OF PHQ2 SCORE: 0

## 2022-07-27 NOTE — PROGRESS NOTES
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    HISTORY OF THE PRESENT ILLNESS: Patient is a 52 y.o. female. This pleasant patient is here today to establish care and discuss chronic conditions. Her prior PCP was Haydee Cortés DO.    Problem   Bmi 30.0-30.9,Adult    Chronic condition. She tries to eat healthy in general. No regular caffeine or sodas. She does regularly exercise with hiking.     Alternating Constipation and Diarrhea    Chronic condition.     Decreased Libido    Chronic condition since around age 45. She describes lack of sex drive. This has been a persistent issue for her, despite having a really good relationship with her  whom she is with everyday. She reports there was a 3-month period in the past when she regularly received testosterone injection as she was working out at the time.     Preventative Health Care    Patient is here to establish care today. Feels well overall.    Health Maintenance: reviewed  Vaccines: due for Tdap, Shingrix, Moderna COVID #2 received 2021 (no plans for booster)  Pap smear 10/2020 negative  Mammogram 2020 negative  Colonoscopy never done, interested in getting referral    OB/GYN: , menopause around age 51    Discussed and offered the no cost genetic screening through [facility]. Patient is already participating.     Vitamin B Deficiency    Chronic condition. She is taking vitamin B supplements including niacin, B6, B12 injections.     Folate Deficiency    Chronic condition.     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 She  "is taking B12 supplements.     Hypertriglyceridemia    Chronic condition.     Vitamin D Deficiency    Chronic condition. She is taking vitamin D supplement.         No current [facility]-ordered outpatient medications on file.     No current [facility]-ordered facility-administered medications on file.     Social history  Living situation: lives with  at home, originally from [place]  Occupation: works as  for [employer]  Marital status:   Alcohol/tobacco/illicit drugs: never smoker, rare EtOH, denies illicit drugs    Health Maintenance: reviewed  Vaccines: due for Tdap, Shingrix, Moderna COVID #2 received 2021 (no plans for booster)  Pap smear 10/2020 negative  Mammogram 2020 negative    OB/GYN: , menopause around age 51    ROS:   Gen: no fevers/chills, no changes in weight  Eyes: no changes in vision  ENT: no sore throat  Pulm: no sob, no cough  CV: no chest pain, no palpitations  GI: no nausea/vomiting, + alternating constipation/diarrhea  : no dysuria  MSk: no myalgias  Skin: no rash  Neuro: no headaches, no numbness/tingling      Objective:     Exam: /68 (BP Location: Left arm, Patient Position: Sitting, BP Cuff Size: Adult)   Pulse 78   Temp 36.6 °C (97.9 °F) (Temporal)   Resp 16   Ht 1.6 m (5' 3\")   Wt 77.3 kg (170 lb 6.4 oz)   SpO2 98%  Body mass index is 30.19 kg/m².    General: Normal appearing. No distress.  HEENT: Normocephalic. Nasal mucosa benign, oropharynx is without erythema, edema or exudates.   Neck: Supple without JVD or bruit. Thyroid is not enlarged.  Pulmonary: Clear to ausculation. Normal effort. No rales, ronchi, or wheezing.  Cardiovascular: Regular rate and rhythm without murmur. Carotid and radial pulses are intact and equal bilaterally.  Abdomen: Soft, nontender, nondistended. Normal bowel sounds.  Neurologic: Grossly nonfocal  Skin: Warm and dry. No obvious lesions.  Musculoskeletal: Normal gait. No extremity cyanosis, clubbing, or edema.  Psych: " Normal mood and affect. Alert and oriented x3. Judgment and insight is normal.    Labs:   Lab Results   Component Value Date/Time    WBC 5.5 10/22/2021 01:41 PM    RBC 5.04 10/22/2021 01:41 PM    HEMOGLOBIN 15.4 10/22/2021 01:41 PM    HEMATOCRIT 45.1 10/22/2021 01:41 PM    MCV 89.5 10/22/2021 01:41 PM    MCH 30.6 10/22/2021 01:41 PM    MCHC 34.1 10/22/2021 01:41 PM    RDW 39.1 10/22/2021 01:41 PM    PLATELETCT 266 10/22/2021 01:41 PM    MPV 9.2 10/22/2021 01:41 PM      Lab Results   Component Value Date/Time    SODIUM 137 07/06/2021 10:14 AM    POTASSIUM 4.2 07/06/2021 10:14 AM    CHLORIDE 102 07/06/2021 10:14 AM    CO2 27 07/06/2021 10:14 AM    ANION 8.0 07/06/2021 10:14 AM    GLUCOSE 103 (H) 07/06/2021 10:14 AM    BUN 11 07/06/2021 10:14 AM    CREATININE 0.83 07/06/2021 10:14 AM    CALCIUM 9.0 07/06/2021 10:14 AM    ASTSGOT 22 07/06/2021 10:14 AM    ALTSGPT 19 07/06/2021 10:14 AM    TBILIRUBIN 0.6 07/06/2021 10:14 AM    ALBUMIN 4.2 07/06/2021 10:14 AM    TOTPROTEIN 6.9 07/06/2021 10:14 AM    GLOBULIN 2.7 07/06/2021 10:14 AM    AGRATIO 1.6 07/06/2021 10:14 AM     Lab Results   Component Value Date/Time    CHOLSTRLTOT 196 07/06/2021 1014    TRIGLYCERIDE 243 (H) 07/06/2021 1014    HDL 53 07/06/2021 1014    LDL 94 07/06/2021 1014     25-Hydroxy   Vitamin D 25 (ng/mL)   Date Value   07/06/2021 41     Lab Results   Component Value Date/Time    [id number] 07/06/2021 1014       Assessment & Plan:   52 y.o. female with the following -    1. Alternating constipation and diarrhea  Chronic condition, persistent. Suspect IBS more constipation predominant based on her history. Advised to trial Miralax as needed for constipation. Consider trial of probiotics or peppermint oil capsules. Handout of low FODMAP diet provided to patient.    2. Hypertriglyceridemia  Chronic condition. Plan to reassess.  - Lipid Profile; Future  - TSH WITH REFLEX TO FT4; Future    3. Decreased libido  Chronic condition, persistent. Discussed  options for sex therapy vs medication therapy. Patient would like to defer for now and will let me know if this becomes problematic for her.    4. Heterozygous MTHFR mutation C677T  Chronic condition.  - plan to reassess her vitamin deficiencies    5. Vitamin B deficiency  Chronic condition. Plan to reassess.  - VITAMIN B12; Future  - VITAMIN B6; Future  - VITAMIN B3 NIACIN; Future  - VITAMIN B1; Future    6. Vitamin D deficiency  Chronic condition. Plan to reassess.  - VITAMIN D,25 HYDROXY; Future    7. Folate deficiency  Chronic condition. Plan to reassess.  - FOLATE; Future    8. BMI 30.0-30.9,adult  - Comp Metabolic Panel; Future  - HEMOGLOBIN A1C; Future  - TSH WITH REFLEX TO FT4; Future  - Patient identified as having weight management issue.  Appropriate orders and counseling given.    9. Preventative health care  - recommended age appropriate vaccinations  - CBC WITH DIFFERENTIAL; Future    10. Colon cancer screening  - Referral to GI for Colonoscopy    11. Need for hepatitis C screening test  - HEP C VIRUS ANTIBODY; Future    12. Screening for HIV (human immunodeficiency virus)  - HIV AG/AB COMBO ASSAY SCREENING; Future    13. Encounter to establish care with new doctor        Return in about 2 weeks (around 8/10/2022) for annual physical.    Please note that this dictation was created using voice recognition software. I have made every reasonable attempt to correct obvious errors, but I expect that there are errors of grammar and possibly content that I did not discover before finalizing the note.

## 2022-07-28 ENCOUNTER — APPOINTMENT (OUTPATIENT)
Dept: RADIOLOGY | Facility: MEDICAL CENTER | Age: 52
End: 2022-07-28
Attending: FAMILY MEDICINE
Payer: COMMERCIAL

## 2022-07-28 DIAGNOSIS — Z12.31 VISIT FOR SCREENING MAMMOGRAM: ICD-10-CM

## 2022-07-28 LAB — FOLATE SERPL-MCNC: 29.7 NG/ML

## 2022-07-31 LAB — VIT B1 BLD-MCNC: 136 NMOL/L (ref 70–180)

## 2022-08-01 LAB — VIT B6 SERPL-MCNC: 506.1 NMOL/L (ref 20–125)

## 2022-08-03 LAB — NIACIN SERPL-MCNC: 2.73 UG/ML (ref 0.5–8.45)

## 2022-08-09 ENCOUNTER — HOSPITAL ENCOUNTER (OUTPATIENT)
Dept: RADIOLOGY | Facility: MEDICAL CENTER | Age: 52
End: 2022-08-09
Payer: COMMERCIAL

## 2022-08-10 ENCOUNTER — OFFICE VISIT (OUTPATIENT)
Dept: MEDICAL GROUP | Facility: MEDICAL CENTER | Age: 52
End: 2022-08-10
Payer: COMMERCIAL

## 2022-08-10 VITALS
TEMPERATURE: 97.8 F | OXYGEN SATURATION: 98 % | WEIGHT: 171.96 LBS | SYSTOLIC BLOOD PRESSURE: 124 MMHG | RESPIRATION RATE: 16 BRPM | BODY MASS INDEX: 30.47 KG/M2 | DIASTOLIC BLOOD PRESSURE: 82 MMHG | HEIGHT: 63 IN | HEART RATE: 75 BPM

## 2022-08-10 DIAGNOSIS — E55.9 VITAMIN D DEFICIENCY: ICD-10-CM

## 2022-08-10 DIAGNOSIS — Z15.89 HETEROZYGOUS MTHFR MUTATION C677T: ICD-10-CM

## 2022-08-10 DIAGNOSIS — Z00.00 PREVENTATIVE HEALTH CARE: ICD-10-CM

## 2022-08-10 DIAGNOSIS — E78.1 HYPERTRIGLYCERIDEMIA: ICD-10-CM

## 2022-08-10 DIAGNOSIS — E53.8 FOLATE DEFICIENCY: ICD-10-CM

## 2022-08-10 DIAGNOSIS — E53.9 VITAMIN B DEFICIENCY: ICD-10-CM

## 2022-08-10 PROCEDURE — 99396 PREV VISIT EST AGE 40-64: CPT | Performed by: STUDENT IN AN ORGANIZED HEALTH CARE EDUCATION/TRAINING PROGRAM

## 2022-08-10 ASSESSMENT — FIBROSIS 4 INDEX: FIB4 SCORE: 0.97

## 2022-08-10 NOTE — PROGRESS NOTES
Subjective:     CC: annual physical    HPI:   Enriqueta presents today for annual physical    Problem   Preventative Health Care    Patient is here for annual physical today. Feels well overall.    Health Maintenance: reviewed  Vaccines: due for Tdap, Shingrix, Moderna COVID #2 received 2021 (no plans for booster)  Pap smear 10/2020 negative  Mammogram 2020 negative  Colonoscopy never done, interested in getting referral    OB/GYN: , menopause around age 51    Discussed and offered the no cost genetic screening through [facility]. Patient is already participating.     Vitamin B Deficiency    Chronic condition. She is taking vitamin B supplements including niacin, B6, B12 injections.     Folate Deficiency    Chronic condition.     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 She is taking B12 supplements.     Hypertriglyceridemia    Chronic condition. Triglyceride level is mildly elevated 198 (2022).     Vitamin D Deficiency    Chronic condition. She is taking vitamin D supplement.         No current Epic-ordered outpatient medications on file.     No current Epic-ordered facility-administered medications on file.     Social history  Living situation: lives with  at home, originally from [place]  Occupation: works as  for [employer]  Marital status:   Alcohol/tobacco/illicit drugs: never smoker, rare EtOH, denies illicit drugs     Health Maintenance: reviewed  Vaccines: due for Tdap, Shingrix, Moderna COVID #2 received 2021 (no plans for booster)  Pap smear 2021 negative (done in [place])  Mammogram 2020 negative, followed by gynecology Dr. Amanda Osei MD  Colonoscopy never done    OB/GYN: , menopause around age 51     ROS:   Gen: no fevers/chills, no changes in weight  Eyes: no changes in vision  ENT: no sore throat  Pulm: no sob, no cough  CV: no chest pain, no palpitations  GI: no nausea/vomiting, + alternating  "constipation/diarrhea  : no dysuria  MSk: no myalgias  Skin: no rash  Neuro: no headaches, no numbness/tingling    Objective:     Exam:  /82 (BP Location: Left arm, Patient Position: Sitting, BP Cuff Size: Adult)   Pulse 75   Temp 36.6 °C (97.8 °F) (Temporal)   Resp 16   Ht 1.6 m (5' 3\")   Wt 78 kg (171 lb 15.3 oz)   SpO2 98%   BMI 30.46 kg/m²  Body mass index is 30.46 kg/m².    General: Normal appearing. No distress.  HEENT: Normocephalic. Nasal mucosa benign, oropharynx is without erythema, edema or exudates.   Neck: Supple without JVD or bruit. Thyroid is not enlarged.  Pulmonary: Clear to ausculation. Normal effort. No rales, ronchi, or wheezing.  Cardiovascular: Regular rate and rhythm without murmur. Carotid and radial pulses are intact and equal bilaterally.  Abdomen: Soft, nontender, nondistended. Normal bowel sounds.  Neurologic: Grossly nonfocal  Skin: Warm and dry. No obvious lesions.  Musculoskeletal: Normal gait. No extremity cyanosis, clubbing, or edema.  Psych: Normal mood and affect. Alert and oriented x3. Judgment and insight is normal.    Labs:   Lab Results   Component Value Date/Time    WBC 4.8 07/27/2022 09:51 AM    RBC 4.86 07/27/2022 09:51 AM    HEMOGLOBIN 15.4 07/27/2022 09:51 AM    HEMATOCRIT 43.4 07/27/2022 09:51 AM    MCV 89.3 07/27/2022 09:51 AM    MCH 31.7 07/27/2022 09:51 AM    MCHC 35.5 (H) 07/27/2022 09:51 AM    RDW 39.4 07/27/2022 09:51 AM    PLATELETCT 256 07/27/2022 09:51 AM    MPV 9.5 07/27/2022 09:51 AM      Lab Results   Component Value Date/Time    SODIUM 138 07/27/2022 09:51 AM    POTASSIUM 4.0 07/27/2022 09:51 AM    CHLORIDE 100 07/27/2022 09:51 AM    CO2 26 07/27/2022 09:51 AM    ANION 12.0 07/27/2022 09:51 AM    GLUCOSE 101 (H) 07/27/2022 09:51 AM    BUN 16 07/27/2022 09:51 AM    CREATININE 0.77 07/27/2022 09:51 AM    CALCIUM 9.2 07/27/2022 09:51 AM    ASTSGOT 29 07/27/2022 09:51 AM    ALTSGPT 37 07/27/2022 09:51 AM    TBILIRUBIN 0.8 07/27/2022 09:51 AM    " ALBUMIN 4.8 07/27/2022 09:51 AM    TOTPROTEIN 7.4 07/27/2022 09:51 AM    GLOBULIN 2.6 07/27/2022 09:51 AM    AGRATIO 1.8 07/27/2022 09:51 AM     Lab Results   Component Value Date/Time    HBA1C 4.6 07/27/2022 09:51 AM      Lab Results   Component Value Date/Time    CHOLSTRLTOT 200 (H) 07/27/2022 0951    TRIGLYCERIDE 198 (H) 07/27/2022 0951    HDL 52 07/27/2022 0951     (H) 07/27/2022 0951     Lab Results   Component Value Date/Time    TSHULTRASEN 1.270 07/27/2022 0951     Lab Results   Component Value Date/Time    HIBGFSKV89 1816 (H) 07/27/2022 0951     25-Hydroxy   Vitamin D 25 (ng/mL)   Date Value   07/27/2022 55   07/06/2021 41       Assessment & Plan:     52 y.o. female with the following -     1. Preventative health care  - recommended age appropriate vaccinations and cancer screening  - previously referred for colonoscopy, she has upcoming consultation visit with GI  - mammogram scheduled for 8/15/2022  - pap smear previously done with gyn in [place] 07/2021  - follow up 1 year for annual physical    2. Hypertriglyceridemia  Chronic condition, improved.  - advised to maintain a healthy weight and reduction of intake of simple carbohydrates, especially high-glycemic and high-fructose foods, limiting alcohol consumption to no more than two drinks per day in males and to no more than one drink per day for females    3. Vitamin D deficiency  Chronic condition, stable with supplements.  - continue interval monitoring    4. Folate deficiency  Chronic condition, stable with supplements.  - continue interval monitoring    5. Vitamin B deficiency  Chronic condition, stable with supplements.  - continue interval monitoring    6. Heterozygous MTHFR mutation C677T  Chronic condition, stable with supplements.  - continue interval monitoring    Return in about 1 year (around 8/10/2023) for annual physical.    Please note that this dictation was created using voice recognition software. I have made every reasonable  attempt to correct obvious errors, but I expect that there are errors of grammar and possibly content that I did not discover before finalizing the note.

## 2022-08-15 ENCOUNTER — HOSPITAL ENCOUNTER (OUTPATIENT)
Dept: RADIOLOGY | Facility: MEDICAL CENTER | Age: 52
End: 2022-08-15
Attending: STUDENT IN AN ORGANIZED HEALTH CARE EDUCATION/TRAINING PROGRAM
Payer: COMMERCIAL

## 2022-08-15 DIAGNOSIS — Z12.31 VISIT FOR SCREENING MAMMOGRAM: ICD-10-CM

## 2022-08-15 PROCEDURE — 77063 BREAST TOMOSYNTHESIS BI: CPT

## 2022-08-23 LAB
APOB+LDLR+PCSK9 GENE MUT ANL BLD/T: NOT DETECTED
BRCA1+BRCA2 DEL+DUP + FULL MUT ANL BLD/T: NOT DETECTED
MLH1+MSH2+MSH6+PMS2 GN DEL+DUP+FUL M: NOT DETECTED

## 2022-10-05 ENCOUNTER — TELEMEDICINE (OUTPATIENT)
Dept: MEDICAL GROUP | Facility: MEDICAL CENTER | Age: 52
End: 2022-10-05
Payer: COMMERCIAL

## 2022-10-05 VITALS — BODY MASS INDEX: 30.12 KG/M2 | HEIGHT: 63 IN | WEIGHT: 170 LBS

## 2022-10-05 DIAGNOSIS — U07.1 COVID-19: ICD-10-CM

## 2022-10-05 PROCEDURE — 99213 OFFICE O/P EST LOW 20 MIN: CPT | Mod: 95 | Performed by: STUDENT IN AN ORGANIZED HEALTH CARE EDUCATION/TRAINING PROGRAM

## 2022-10-05 ASSESSMENT — FIBROSIS 4 INDEX: FIB4 SCORE: 0.97

## 2022-10-06 NOTE — PROGRESS NOTES
Virtual Visit: Established Patient   This visit was conducted via Zoom using secure and encrypted videoconferencing technology.   The patient was in their home in the [facility].    The patient's identity was confirmed and verbal consent was obtained for this virtual visit.    Subjective:   CC:   Chief Complaint   Patient presents with    Coronavirus Screening     Positive home test:10/5/22  Symptom onset: 10/3/22  Body aches, chills, fatigue, headache, runny nose, cough      Enriqueta Valdovinos is a 52 y.o. female presenting for evaluation and management of:    Problem   Covid-19    Acute condition.    Date of symptoms onset: 10/3/2022  Symptoms: fatigue, runny nose, dry cough,   Denies fever, chest pain, shortness of breath  Date of COVID positive test: 10/4/2022  Medications tried: OTC Tylenol with mild relief  Home max temperature: no fever  Home O2 saturation: did not check  She has been able to tolerate PO but with reduced appetite. Normal urination.    COVID vaccination status: Moderna COVID #2 received 05/2021  Potential exposure: has been traveling recently       ROS   See HPI    Current medicines (including changes today)  No current outpatient medications on file.     No current facility-administered medications for this visit.       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  Respiratory: Unlabored respiratory effort  Neuro: Alert and oriented x3, normal conversation.     Assessment and Plan:   The following treatment plan was discussed:     1. COVID-19  Acute condition, clinically appears mild and stable. She reports she is overall recovering slowly. We discussed option for anti-viral medication treatment and we agreed on symptomatic management at this time given her improvement.  - continue symptomatic therapy as needed: over the counter Tylenol as needed for pain/headache/fever, antihistamine/decongestant as needed for runny nose/congestion, Mucinex/DM as needed for cough. Call or return to clinic prn if these symptoms worsen or fail to improve as anticipated.  - advised to maintain adequate hydration, regular handwashing, facemask, social distancing, isolation at home through Saturday 10/8/22  - advised to monitor pulse oximetry at home and to maintain O2 sat 90% or above  - may consider trying over the counter supplements to help with your symptoms: vitamin C 500mg two times daily, vitamin D3 2000 international units daily, zinc 75-100mg daily, melatonin 5-10mg before bedtime as needed  - strict ED precautions discussed if worsening mental status, chest pain, shortness of breath, lack of urination, persistent oxygen saturation lower than 90%    Follow-up: Return if symptoms worsen or fail to improve.         "

## 2023-06-12 DIAGNOSIS — E53.8 FOLATE DEFICIENCY: ICD-10-CM

## 2023-06-12 DIAGNOSIS — E55.9 VITAMIN D DEFICIENCY: ICD-10-CM

## 2023-06-12 DIAGNOSIS — E53.9 VITAMIN B DEFICIENCY: ICD-10-CM

## 2023-06-12 DIAGNOSIS — Z00.00 PREVENTATIVE HEALTH CARE: ICD-10-CM

## 2023-08-09 ENCOUNTER — APPOINTMENT (OUTPATIENT)
Dept: MEDICAL GROUP | Facility: MEDICAL CENTER | Age: 53
End: 2023-08-09
Payer: COMMERCIAL

## 2023-08-10 ENCOUNTER — HOSPITAL ENCOUNTER (OUTPATIENT)
Dept: LAB | Facility: MEDICAL CENTER | Age: 53
End: 2023-08-10
Attending: STUDENT IN AN ORGANIZED HEALTH CARE EDUCATION/TRAINING PROGRAM
Payer: COMMERCIAL

## 2023-08-10 DIAGNOSIS — E53.9 VITAMIN B DEFICIENCY: ICD-10-CM

## 2023-08-10 DIAGNOSIS — Z00.00 PREVENTATIVE HEALTH CARE: ICD-10-CM

## 2023-08-10 DIAGNOSIS — E55.9 VITAMIN D DEFICIENCY: ICD-10-CM

## 2023-08-10 DIAGNOSIS — E53.8 FOLATE DEFICIENCY: ICD-10-CM

## 2023-08-10 LAB
25(OH)D3 SERPL-MCNC: 39 NG/ML (ref 30–100)
ALBUMIN SERPL BCP-MCNC: 4.7 G/DL (ref 3.2–4.9)
ALBUMIN/GLOB SERPL: 2.1 G/DL
ALP SERPL-CCNC: 70 U/L (ref 30–99)
ALT SERPL-CCNC: 24 U/L (ref 2–50)
ANION GAP SERPL CALC-SCNC: 9 MMOL/L (ref 7–16)
AST SERPL-CCNC: 23 U/L (ref 12–45)
BASOPHILS # BLD AUTO: 0.5 % (ref 0–1.8)
BASOPHILS # BLD: 0.02 K/UL (ref 0–0.12)
BILIRUB SERPL-MCNC: 0.9 MG/DL (ref 0.1–1.5)
BUN SERPL-MCNC: 11 MG/DL (ref 8–22)
CALCIUM ALBUM COR SERPL-MCNC: 8.9 MG/DL (ref 8.5–10.5)
CALCIUM SERPL-MCNC: 9.5 MG/DL (ref 8.4–10.2)
CHLORIDE SERPL-SCNC: 100 MMOL/L (ref 96–112)
CHOLEST SERPL-MCNC: 193 MG/DL (ref 100–199)
CO2 SERPL-SCNC: 26 MMOL/L (ref 20–33)
CREAT SERPL-MCNC: 0.89 MG/DL (ref 0.5–1.4)
EOSINOPHIL # BLD AUTO: 0.1 K/UL (ref 0–0.51)
EOSINOPHIL NFR BLD: 2.4 % (ref 0–6.9)
ERYTHROCYTE [DISTWIDTH] IN BLOOD BY AUTOMATED COUNT: 40.2 FL (ref 35.9–50)
FASTING STATUS PATIENT QL REPORTED: NORMAL
FOLATE SERPL-MCNC: 30.9 NG/ML
GFR SERPLBLD CREATININE-BSD FMLA CKD-EPI: 77 ML/MIN/1.73 M 2
GLOBULIN SER CALC-MCNC: 2.2 G/DL (ref 1.9–3.5)
GLUCOSE SERPL-MCNC: 113 MG/DL (ref 65–99)
HBV SURFACE AB SERPL IA-ACNC: <3.5 MIU/ML (ref 0–10)
HCT VFR BLD AUTO: 42.1 % (ref 37–47)
HDLC SERPL-MCNC: 47 MG/DL
HGB BLD-MCNC: 14.5 G/DL (ref 12–16)
IMM GRANULOCYTES # BLD AUTO: 0.01 K/UL (ref 0–0.11)
IMM GRANULOCYTES NFR BLD AUTO: 0.2 % (ref 0–0.9)
LDLC SERPL CALC-MCNC: 115 MG/DL
LYMPHOCYTES # BLD AUTO: 1.08 K/UL (ref 1–4.8)
LYMPHOCYTES NFR BLD: 26.2 % (ref 22–41)
MCH RBC QN AUTO: 31 PG (ref 27–33)
MCHC RBC AUTO-ENTMCNC: 34.4 G/DL (ref 32.2–35.5)
MCV RBC AUTO: 90 FL (ref 81.4–97.8)
MONOCYTES # BLD AUTO: 0.33 K/UL (ref 0–0.85)
MONOCYTES NFR BLD AUTO: 8 % (ref 0–13.4)
NEUTROPHILS # BLD AUTO: 2.58 K/UL (ref 1.82–7.42)
NEUTROPHILS NFR BLD: 62.7 % (ref 44–72)
NRBC # BLD AUTO: 0 K/UL
NRBC BLD-RTO: 0 /100 WBC (ref 0–0.2)
PLATELET # BLD AUTO: 240 K/UL (ref 164–446)
PMV BLD AUTO: 9.3 FL (ref 9–12.9)
POTASSIUM SERPL-SCNC: 4.9 MMOL/L (ref 3.6–5.5)
PROT SERPL-MCNC: 6.9 G/DL (ref 6–8.2)
RBC # BLD AUTO: 4.68 M/UL (ref 4.2–5.4)
SODIUM SERPL-SCNC: 135 MMOL/L (ref 135–145)
TRIGL SERPL-MCNC: 156 MG/DL (ref 0–149)
TSH SERPL DL<=0.005 MIU/L-ACNC: 1.1 UIU/ML (ref 0.38–5.33)
VIT B12 SERPL-MCNC: 684 PG/ML (ref 211–911)
WBC # BLD AUTO: 4.1 K/UL (ref 4.8–10.8)

## 2023-08-10 PROCEDURE — 82607 VITAMIN B-12: CPT

## 2023-08-10 PROCEDURE — 84425 ASSAY OF VITAMIN B-1: CPT

## 2023-08-10 PROCEDURE — 85025 COMPLETE CBC W/AUTO DIFF WBC: CPT

## 2023-08-10 PROCEDURE — 80053 COMPREHEN METABOLIC PANEL: CPT

## 2023-08-10 PROCEDURE — 82746 ASSAY OF FOLIC ACID SERUM: CPT

## 2023-08-10 PROCEDURE — 86706 HEP B SURFACE ANTIBODY: CPT

## 2023-08-10 PROCEDURE — 36415 COLL VENOUS BLD VENIPUNCTURE: CPT

## 2023-08-10 PROCEDURE — 82306 VITAMIN D 25 HYDROXY: CPT

## 2023-08-10 PROCEDURE — 80061 LIPID PANEL: CPT

## 2023-08-10 PROCEDURE — 84207 ASSAY OF VITAMIN B-6: CPT

## 2023-08-10 PROCEDURE — 84443 ASSAY THYROID STIM HORMONE: CPT

## 2023-08-10 PROCEDURE — 84591 ASSAY OF NOS VITAMIN: CPT

## 2023-08-13 LAB — VIT B1 BLD-MCNC: 156 NMOL/L (ref 70–180)

## 2023-08-15 ENCOUNTER — OFFICE VISIT (OUTPATIENT)
Dept: MEDICAL GROUP | Facility: MEDICAL CENTER | Age: 53
End: 2023-08-15
Payer: COMMERCIAL

## 2023-08-15 VITALS
HEART RATE: 94 BPM | TEMPERATURE: 96.8 F | WEIGHT: 172.4 LBS | OXYGEN SATURATION: 94 % | BODY MASS INDEX: 30.55 KG/M2 | DIASTOLIC BLOOD PRESSURE: 68 MMHG | SYSTOLIC BLOOD PRESSURE: 102 MMHG | HEIGHT: 63 IN

## 2023-08-15 DIAGNOSIS — Z12.11 COLON CANCER SCREENING: ICD-10-CM

## 2023-08-15 DIAGNOSIS — Z15.89 HETEROZYGOUS MTHFR MUTATION C677T: ICD-10-CM

## 2023-08-15 DIAGNOSIS — E53.8 FOLATE DEFICIENCY: ICD-10-CM

## 2023-08-15 DIAGNOSIS — Z00.00 PREVENTATIVE HEALTH CARE: ICD-10-CM

## 2023-08-15 DIAGNOSIS — R07.0 THROAT DISCOMFORT: ICD-10-CM

## 2023-08-15 DIAGNOSIS — E53.9 VITAMIN B DEFICIENCY: ICD-10-CM

## 2023-08-15 DIAGNOSIS — E78.1 HYPERTRIGLYCERIDEMIA: ICD-10-CM

## 2023-08-15 DIAGNOSIS — R68.82 DECREASED LIBIDO: ICD-10-CM

## 2023-08-15 DIAGNOSIS — E55.9 VITAMIN D DEFICIENCY: ICD-10-CM

## 2023-08-15 LAB — VIT B6 SERPL-MCNC: 60.3 NMOL/L (ref 20–125)

## 2023-08-15 PROCEDURE — 3078F DIAST BP <80 MM HG: CPT | Performed by: STUDENT IN AN ORGANIZED HEALTH CARE EDUCATION/TRAINING PROGRAM

## 2023-08-15 PROCEDURE — 99214 OFFICE O/P EST MOD 30 MIN: CPT | Mod: 25 | Performed by: STUDENT IN AN ORGANIZED HEALTH CARE EDUCATION/TRAINING PROGRAM

## 2023-08-15 PROCEDURE — 99396 PREV VISIT EST AGE 40-64: CPT | Performed by: STUDENT IN AN ORGANIZED HEALTH CARE EDUCATION/TRAINING PROGRAM

## 2023-08-15 PROCEDURE — 3074F SYST BP LT 130 MM HG: CPT | Performed by: STUDENT IN AN ORGANIZED HEALTH CARE EDUCATION/TRAINING PROGRAM

## 2023-08-15 ASSESSMENT — PATIENT HEALTH QUESTIONNAIRE - PHQ9: CLINICAL INTERPRETATION OF PHQ2 SCORE: 0

## 2023-08-15 ASSESSMENT — FIBROSIS 4 INDEX: FIB4 SCORE: 1.04

## 2023-08-15 NOTE — PROGRESS NOTES
Subjective:     CC:   Chief Complaint   Patient presents with    Annual Exam    Other     Something stuck in throat, back is tight        HPI:   Enriqueta Valdovinos is a 53 y.o. female who presents for annual exam. She is feeling well and denies any complaints.    Problem   Throat Discomfort    Acute condition.    Character: scratching sensation  Onset: several days  Location: right side of throat  Duration: intermittent  Exacerbating factors: unknown  Relieving factors: none  Associated symptoms: none  Severity: persistent     Bmi 30.0-30.9,Adult    Chronic condition. She tries to eat healthy in general. No regular caffeine or sodas. She does regularly exercise with hiking.     Decreased Libido    Chronic condition since around age 45. She describes lack of sex drive. This has been a persistent issue for her, despite having a really good relationship with her  whom she is with everyday. She reports there was a 3-month period in the past when she regularly received testosterone injection as she was working out at the time. She would like to assess her hormone levels at this time.     Preventative Health Care    Patient is here for annual physical today. Feels well overall.     Vitamin B Deficiency    Chronic condition. She is taking vitamin B supplements including niacin, B6, B12 injections.     Folate Deficiency    Chronic condition.     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 She is taking B12 supplements.     Hypertriglyceridemia    Chronic condition.     Vitamin D Deficiency    Chronic condition. She is taking vitamin D supplement.         Ob-Gyn/ History:    Patient has GYN provider: yes  /Para:   Last Pap Smear:  2023. No history of abnormal pap smears.  Gyn Surgery: endometrial ablation 2018  Current Contraceptive Method: none. She is currently sexually active.  Last menstrual period:  menopause around age 51.   No significant bloating/fluid retention, pelvic pain, or  dyspareunia. No vaginal discharge  Post-menopausal bleeding: none  Urinary incontinence: none  Folate intake: n/a    Health Maintenance  Advanced directive: n/a  Osteoporosis Screen/ DEXA: n/a  PT/vit D for falls prevention: n/a   Cholesterol Screening: done  Diabetes Screening: n/a  Aspirin Use: n/a  Diet: eats healthy in general  Exercise: regular  Substance Abuse: n/a  Safe in relationship.  Seat belts, bike helmet, gun safety discussed.  Sun protection used.    Cancer screening  Colorectal Cancer Screening: due  Lung Cancer Screening: n/a  Cervical Cancer Screening: last done 07/2023 normal  Breast Cancer Screening: done 08/2022 normal, prefers every-2-year screening    Infectious disease screening/Immunizations  --STI Screening: n/a  --Practices safe sex.  --HIV Screening: done  --Hepatitis C Screening: done  --Immunizations:    Influenza: n/a   HPV:  n/a   Tetanus: due   Shingles: due   Pneumococcal : n/a   COVID-19: Moderna COVID #2 received 05/2021 (no plans for booster)  Other immunizations: n/a    She  has a past medical history of Abnormal Pap smear of vagina.  She  has a past surgical history that includes endometrial ablation (2018).    Family History   Problem Relation Age of Onset    Anxiety disorder Mother     Other Mother         PAD with stent placement    Cancer Father         bladder, hx of smoking    Hyperlipidemia Sister     Obesity Daughter        Social History     Socioeconomic History    Marital status:      Spouse name: Not on file    Number of children: Not on file    Years of education: Not on file    Highest education level: Not on file   Occupational History    Not on file   Tobacco Use    Smoking status: Never    Smokeless tobacco: Never   Vaping Use    Vaping Use: Never used   Substance and Sexual Activity    Alcohol use: Yes     Comment: rare    Drug use: Not Currently    Sexual activity: Yes     Partners: Male     Comment:    Other Topics Concern    Not on file    Social History Narrative    Not on file     Social Determinants of Health     Financial Resource Strain: Unknown (7/26/2022)    Overall Financial Resource Strain (CARDIA)     Difficulty of Paying Living Expenses: Patient refused   Food Insecurity: Unknown (7/26/2022)    Hunger Vital Sign     Worried About Running Out of Food in the Last Year: Patient refused     Ran Out of Food in the Last Year: Patient refused   Transportation Needs: Unknown (7/26/2022)    PRAPARE - Transportation     Lack of Transportation (Medical): Patient refused     Lack of Transportation (Non-Medical): Patient refused   Physical Activity: Sufficiently Active (7/26/2022)    Exercise Vital Sign     Days of Exercise per Week: 1 day     Minutes of Exercise per Session: 150+ min   Stress: No Stress Concern Present (7/26/2022)    [employer] - Occupational Stress Questionnaire     Feeling of Stress : Not at all   Social Connections: Unknown (7/26/2022)    Social Connection and Isolation Panel [NHANES]     Frequency of Communication with Friends and Family: Patient refused     Frequency of Social Gatherings with Friends and Family: Patient refused     Attends [employer] Services: Patient refused     Active Member of Clubs or Organizations: Patient refused     Attends Club or Organization Meetings: Patient refused     Marital Status: Patient refused   Intimate Partner Violence: Not on file   Housing Stability: Unknown (7/26/2022)    Housing Stability Vital Sign     Unable to Pay for Housing in the Last Year: Patient refused     Number of Places Lived in the Last Year: Not on file     Unstable Housing in the Last Year: Patient refused       Patient Active Problem List    Diagnosis Date Noted    Throat discomfort 08/15/2023    COVID-19 10/05/2022    BMI 30.0-30.9,adult 07/27/2022    Alternating constipation and diarrhea 07/27/2022    Decreased libido 07/27/2022    Preventative health care 07/26/2022    Milk protein  "intolerance 07/16/2021    LOUIS (obstructive sleep apnea) 07/16/2021    History of gestational diabetes 07/16/2021    Vaginal bleeding 06/30/2021    Stress incontinence 06/30/2021    Vitamin B deficiency 06/30/2021    Folate deficiency 06/30/2021    Heterozygous MTHFR mutation C677T 07/23/2020    Elevated fasting glucose 07/20/2020    Hypertriglyceridemia 07/20/2020    Vitamin D deficiency 07/20/2020         No current outpatient medications on file.     No current facility-administered medications for this visit.     Allergies   Allergen Reactions    Seasonal Cough and Runny Nose       Review of Systems   Constitutional: Negative for fever, chills and malaise/fatigue.   HENT: Negative for congestion. + throat discomfort.  Eyes: Negative for pain.    Respiratory: Negative for cough and shortness of breath.  Cardiovascular: Negative for leg swelling.   Gastrointestinal: Negative for nausea, vomiting, abdominal pain and diarrhea.   Genitourinary: Negative for dysuria and hematuria.   Skin: Negative for rash.   Neurological: Negative for dizziness, focal weakness and headaches.   Endo/Heme/Allergies: Does not bleed easily.   Psychiatric/Behavioral: Negative for depression.  The patient is not nervous/anxious.      Objective:     /68 (BP Location: Left arm, Patient Position: Sitting, BP Cuff Size: Adult)   Pulse 94   Temp 36 °C (96.8 °F) (Temporal)   Ht 1.6 m (5' 3\")   Wt 78.2 kg (172 lb 6.4 oz)   SpO2 94%   BMI 30.54 kg/m²   Body mass index is 30.54 kg/m².  Wt Readings from Last 4 Encounters:   08/15/23 78.2 kg (172 lb 6.4 oz)   10/05/22 77.1 kg (170 lb)   08/10/22 78 kg (171 lb 15.3 oz)   07/27/22 77.3 kg (170 lb 6.4 oz)       Physical Exam:  Constitutional: Well-developed and well-nourished. Not diaphoretic. No distress.   Skin: Skin is warm and dry. No rash noted.  Head: Atraumatic without lesions.  Eyes: Conjunctivae and extraocular motions are normal. Pupils are equal, round, and reactive to light. No " scleral icterus.   Ears:  External ears unremarkable. Tympanic membranes clear and intact.  Nose: Nares patent. Septum midline. Turbinates without erythema nor edema. No discharge.   Mouth/Throat: Dentition is good. Tongue normal. Oropharynx is clear and moist. Posterior pharynx without erythema or exudates.  Neck: Supple, trachea midline. Normal range of motion. No thyromegaly present. No lymphadenopathy--cervical or supraclavicular.  Cardiovascular: Regular rate and rhythm, S1 and S2 without murmur, rubs, or gallops.  Lungs: Normal inspiratory effort, CTA bilaterally, no wheezes/rhonchi/rales  Breast: deferred  Abdomen: Soft, non tender, and without distention. Active bowel sounds in all four quadrants.  : deferred  Extremities: No cyanosis, clubbing, erythema, nor edema. Distal pulses intact and symmetric.   Musculoskeletal: All major joints AROM full in all directions without pain.  Neurological: Alert and oriented x 3. DTRs 2+/3 and symmetric. No cranial nerve deficit. 5/5 myotomes. Sensation intact.   Psychiatric:  Behavior, mood, and affect are appropriate.    Assessment and Plan:     1. Preventative health care  - Recommended age appropriate vaccinations and cancer screening  - Labs per orders.  - Vaccinations reviewed and discussed.  - Counseling about diet, supplements, exercise, skin care.  - Follow up 1 year for annual physical    CBC WITH DIFFERENTIAL    Comp Metabolic Panel    Lipid Profile    TSH WITH REFLEX TO FT4      2. Throat discomfort  Acute condition, persistent. Unclear etiology at this time. Possible transient viral respiratory etiology vs small foreign body. Refer to ENT for further evaluation and/or management.    Referral to ENT      3. Decreased libido  Chronic condition, persistent. Follow up labs per orders.    ESTRADIOL    PROLACTIN    FSH/LH    TESTOSTERONE F&T FEMALES/CHILD      4. Hypertriglyceridemia  Chronic condition. Stable with healthy lifestyle management.  - cont interval  monitoring    Lipid Profile    TSH WITH REFLEX TO FT4      5. Heterozygous MTHFR mutation C677T  Chronic condition, stable without supplements.  - continue interval monitoring        6. Vitamin B deficiency  Chronic condition, stable without supplements.  - continue interval monitoring    VITAMIN B12    VITAMIN B1    VITAMIN B3 NIACIN    VITAMIN B6      7. Folate deficiency  Chronic condition, stable without supplements.  - continue interval monitoring    FOLATE      8. Vitamin D deficiency  Chronic condition, stable without supplements.  - continue interval monitoring    VITAMIN D,25 HYDROXY (DEFICIENCY)      9. BMI 30.0-30.9,adult  Patient identified as having weight management issue.  Appropriate orders and counseling given.    Comp Metabolic Panel    Lipid Profile    TSH WITH REFLEX TO FT4      10. Colon cancer screening  Referral to GI for Colonoscopy          Follow-up: Return in about 1 year (around 8/15/2024) for annual physical.    Patient is aware that I will be leaving [facility] at the end of September and that she will need to establish with a new primary care provider. It has been a real privilege serving as her family doctor.

## 2023-08-15 NOTE — LETTER
[employer]  Alvarez Grant D.O.  [address]  [place] [address]  Fax: [phone]   Authorization for Release/Disclosure of   Protected Health Information   Name: NANI VALDOVINOS : 1970 SSN: [id number]   Address: [address] [place] [address]  [place] [address] Phone:    [phone] (home)    I authorize the entity listed below to release/disclose the PHI below to:   [employer]/Alvarez Grant D.O. and Alvarez Grant D.O.   Provider or Entity Name:  [place] [facility]   Address   [place], [facility]   Phone:      Fax: [phone]     Reason for request: continuity of care   Information to be released:    [  ] LAST COLONOSCOPY,  including any PATH REPORT and follow-up  [  ] LAST FIT/COLOGUARD RESULT [  ] LAST DEXA  [  ] LAST MAMMOGRAM  [X] LAST PAP  [  ] LAST LABS [  ] RETINA EXAM REPORT  [  ] IMMUNIZATION RECORDS  [  ] Release all info      [  ] Check here and initial the line next to each item to release ALL health information INCLUDING  _____ Care and treatment for drug and / or alcohol abuse  _____ HIV testing, infection status, or AIDS  _____ Genetic Testing    DATES OF SERVICE OR TIME PERIOD TO BE DISCLOSED: _____________  I understand and acknowledge that:  * This Authorization may be revoked at any time by you in writing, except if your health information has already been used or disclosed.  * Your health information that will be used or disclosed as a result of you signing this authorization could be re-disclosed by the recipient. If this occurs, your re-disclosed health information may no longer be protected by State or Federal laws.  * You may refuse to sign this Authorization. Your refusal will not affect your ability to obtain treatment.  * This Authorization becomes effective upon signing and will  on (date) __________.      If no date is indicated, this Authorization will  one (1) year from the signature date.    Name: Nani Valdovinos  Signature: Date:    8/15/2023     PLEASE FAX REQUESTED RECORDS BACK TO: [phone]

## 2023-08-16 ENCOUNTER — HOSPITAL ENCOUNTER (OUTPATIENT)
Dept: LAB | Facility: MEDICAL CENTER | Age: 53
End: 2023-08-16
Attending: STUDENT IN AN ORGANIZED HEALTH CARE EDUCATION/TRAINING PROGRAM
Payer: COMMERCIAL

## 2023-08-16 DIAGNOSIS — R68.82 DECREASED LIBIDO: ICD-10-CM

## 2023-08-16 LAB
ESTRADIOL SERPL-MCNC: <5 PG/ML
FSH SERPL-ACNC: 54.5 MIU/ML
LH SERPL-ACNC: 34.9 IU/L
PROLACTIN SERPL-MCNC: 5.46 NG/ML (ref 2.8–26)

## 2023-08-16 PROCEDURE — 84270 ASSAY OF SEX HORMONE GLOBUL: CPT

## 2023-08-16 PROCEDURE — 84403 ASSAY OF TOTAL TESTOSTERONE: CPT

## 2023-08-16 PROCEDURE — 83001 ASSAY OF GONADOTROPIN (FSH): CPT

## 2023-08-16 PROCEDURE — 36415 COLL VENOUS BLD VENIPUNCTURE: CPT

## 2023-08-16 PROCEDURE — 84402 ASSAY OF FREE TESTOSTERONE: CPT

## 2023-08-16 PROCEDURE — 83002 ASSAY OF GONADOTROPIN (LH): CPT

## 2023-08-16 PROCEDURE — 82670 ASSAY OF TOTAL ESTRADIOL: CPT

## 2023-08-16 PROCEDURE — 84146 ASSAY OF PROLACTIN: CPT

## 2023-08-21 LAB
SHBG SERPL-SCNC: 44 NMOL/L (ref 17–125)
TESTOST FREE SERPL-MCNC: 2.1 PG/ML (ref 0.6–3.8)
TESTOST SERPL-MCNC: 15 NG/DL (ref 9–55)

## 2023-08-22 LAB — NIACIN SERPL-MCNC: NORMAL NG/ML

## 2023-10-30 ENCOUNTER — DOCUMENTATION (OUTPATIENT)
Dept: HEALTH INFORMATION MANAGEMENT | Facility: OTHER | Age: 53
End: 2023-10-30
Payer: COMMERCIAL

## 2023-12-07 ENCOUNTER — APPOINTMENT (OUTPATIENT)
Dept: MEDICAL GROUP | Facility: MEDICAL CENTER | Age: 53
End: 2023-12-07
Payer: COMMERCIAL

## 2024-01-23 ENCOUNTER — APPOINTMENT (OUTPATIENT)
Dept: MEDICAL GROUP | Facility: MEDICAL CENTER | Age: 54
End: 2024-01-23
Payer: COMMERCIAL

## 2024-02-20 ENCOUNTER — APPOINTMENT (OUTPATIENT)
Dept: MEDICAL GROUP | Facility: MEDICAL CENTER | Age: 54
End: 2024-02-20
Payer: COMMERCIAL

## 2024-03-26 ENCOUNTER — OFFICE VISIT (OUTPATIENT)
Dept: MEDICAL GROUP | Facility: MEDICAL CENTER | Age: 54
End: 2024-03-26
Payer: COMMERCIAL

## 2024-03-26 ENCOUNTER — HOSPITAL ENCOUNTER (OUTPATIENT)
Dept: LAB | Facility: MEDICAL CENTER | Age: 54
End: 2024-03-26
Attending: STUDENT IN AN ORGANIZED HEALTH CARE EDUCATION/TRAINING PROGRAM
Payer: COMMERCIAL

## 2024-03-26 VITALS
BODY MASS INDEX: 30.69 KG/M2 | HEART RATE: 85 BPM | HEIGHT: 63 IN | RESPIRATION RATE: 12 BRPM | SYSTOLIC BLOOD PRESSURE: 132 MMHG | OXYGEN SATURATION: 99 % | TEMPERATURE: 97.5 F | DIASTOLIC BLOOD PRESSURE: 80 MMHG | WEIGHT: 173.2 LBS

## 2024-03-26 DIAGNOSIS — Z00.00 ENCOUNTER FOR MEDICAL EXAMINATION TO ESTABLISH CARE: ICD-10-CM

## 2024-03-26 DIAGNOSIS — E61.1 IRON DEFICIENCY: ICD-10-CM

## 2024-03-26 DIAGNOSIS — D21.9 FIBROID: ICD-10-CM

## 2024-03-26 DIAGNOSIS — Z23 NEED FOR VACCINATION: ICD-10-CM

## 2024-03-26 DIAGNOSIS — Z12.11 COLON CANCER SCREENING: ICD-10-CM

## 2024-03-26 DIAGNOSIS — M72.2 PLANTAR FASCIITIS: ICD-10-CM

## 2024-03-26 LAB
FERRITIN SERPL-MCNC: 196 NG/ML (ref 10–291)
IRON SATN MFR SERPL: 22 % (ref 15–55)
IRON SERPL-MCNC: 65 UG/DL (ref 40–170)
TIBC SERPL-MCNC: 292 UG/DL (ref 250–450)
UIBC SERPL-MCNC: 227 UG/DL (ref 110–370)

## 2024-03-26 PROCEDURE — 82728 ASSAY OF FERRITIN: CPT

## 2024-03-26 PROCEDURE — 90715 TDAP VACCINE 7 YRS/> IM: CPT | Performed by: STUDENT IN AN ORGANIZED HEALTH CARE EDUCATION/TRAINING PROGRAM

## 2024-03-26 PROCEDURE — 83550 IRON BINDING TEST: CPT

## 2024-03-26 PROCEDURE — 3075F SYST BP GE 130 - 139MM HG: CPT | Performed by: STUDENT IN AN ORGANIZED HEALTH CARE EDUCATION/TRAINING PROGRAM

## 2024-03-26 PROCEDURE — 99214 OFFICE O/P EST MOD 30 MIN: CPT | Mod: 25 | Performed by: STUDENT IN AN ORGANIZED HEALTH CARE EDUCATION/TRAINING PROGRAM

## 2024-03-26 PROCEDURE — 3079F DIAST BP 80-89 MM HG: CPT | Performed by: STUDENT IN AN ORGANIZED HEALTH CARE EDUCATION/TRAINING PROGRAM

## 2024-03-26 PROCEDURE — 90471 IMMUNIZATION ADMIN: CPT | Performed by: STUDENT IN AN ORGANIZED HEALTH CARE EDUCATION/TRAINING PROGRAM

## 2024-03-26 PROCEDURE — 36415 COLL VENOUS BLD VENIPUNCTURE: CPT

## 2024-03-26 PROCEDURE — 83540 ASSAY OF IRON: CPT

## 2024-03-26 ASSESSMENT — FIBROSIS 4 INDEX: FIB4 SCORE: 1.04

## 2024-03-26 ASSESSMENT — PATIENT HEALTH QUESTIONNAIRE - PHQ9: CLINICAL INTERPRETATION OF PHQ2 SCORE: 0

## 2024-03-26 NOTE — PROGRESS NOTES
"Subjective:     CC:  Diagnoses of Encounter for medical examination to establish care, Fibroid, Plantar fasciitis, Iron deficiency, Colon cancer screening, and Need for vaccination were pertinent to this visit.    HISTORY OF THE PRESENT ILLNESS: Patient is a 53 y.o. female. This pleasant patient is here today to establish care and discuss the following    Problem   Plantar Fasciitis    This is a chronic condition.  Worse in the morning and improving throughout the day.  She is still able to hike without issue.     Iron Deficiency    She is unsure if she has a history of iron deficiency in the past has taken iron supplementation.  No recent labs.     Fibroid    This is a chronic condition.  She has previously had a D&C but had recurrent fibroids.  She is interested in a second opinion to discuss ways to treat this         ROS:   ROS      Objective:     Exam: /80 (BP Location: Left arm, Patient Position: Sitting, BP Cuff Size: Adult)   Pulse 85   Temp 36.4 °C (97.5 °F) (Temporal)   Resp 12   Ht 1.6 m (5' 3\")   Wt 78.6 kg (173 lb 3.2 oz)   SpO2 99%  Body mass index is 30.68 kg/m².    Physical Exam  Vitals reviewed.   Constitutional:       General: She is not in acute distress.     Appearance: She is not toxic-appearing.   HENT:      Head: Normocephalic and atraumatic.      Right Ear: External ear normal.      Left Ear: External ear normal.   Eyes:      General:         Right eye: No discharge.         Left eye: No discharge.      Extraocular Movements: Extraocular movements intact.      Conjunctiva/sclera: Conjunctivae normal.   Cardiovascular:      Rate and Rhythm: Normal rate and regular rhythm.      Heart sounds: Normal heart sounds. No murmur heard.  Pulmonary:      Effort: Pulmonary effort is normal. No respiratory distress.      Breath sounds: No wheezing or rales.   Skin:     General: Skin is warm and dry.   Neurological:      Mental Status: She is alert.   Psychiatric:         Mood and Affect: Mood " normal.         Behavior: Behavior normal.         Thought Content: Thought content normal.         Judgment: Judgment normal.           Assessment & Plan:   53 y.o. female with the following -    1. Encounter for medical examination to establish care      2. Fibroid  [facility] get [facility] opinion.  This has been placed  - Referral to OB/Gyn    3. Plantar fasciitis  Discussed foot stretches to help.  We discussed that if this starts interfering with her daily life or activities to let me know and I can always refer her to podiatry    4. Iron deficiency  Recheck labs  - FERRITIN; Future  - IRON/TOTAL IRON BIND; Future    5. Colon cancer screening  - Cologuard Colon Cancer Screening    6. Need for vaccination  - TDAP VACCINE =>8YO IM      No follow-ups on file.    Please note that this dictation was created using voice recognition software. I have made every reasonable attempt to correct obvious errors, but I expect that there are errors of grammar and possibly content that I did not discover before finalizing the note.

## 2024-08-09 ENCOUNTER — PATIENT MESSAGE (OUTPATIENT)
Dept: MEDICAL GROUP | Facility: MEDICAL CENTER | Age: 54
End: 2024-08-09
Payer: COMMERCIAL

## 2024-08-09 DIAGNOSIS — M72.2 PLANTAR FASCIA SYNDROME: ICD-10-CM

## 2024-10-01 ENCOUNTER — APPOINTMENT (OUTPATIENT)
Dept: RADIOLOGY | Facility: MEDICAL CENTER | Age: 54
End: 2024-10-01
Attending: STUDENT IN AN ORGANIZED HEALTH CARE EDUCATION/TRAINING PROGRAM
Payer: COMMERCIAL

## 2024-10-01 DIAGNOSIS — Z12.31 VISIT FOR SCREENING MAMMOGRAM: ICD-10-CM

## 2024-10-01 PROCEDURE — 77067 SCR MAMMO BI INCL CAD: CPT

## 2024-10-23 ENCOUNTER — OFFICE VISIT (OUTPATIENT)
Dept: MEDICAL GROUP | Facility: MEDICAL CENTER | Age: 54
End: 2024-10-23
Payer: COMMERCIAL

## 2024-10-23 VITALS
DIASTOLIC BLOOD PRESSURE: 62 MMHG | WEIGHT: 173 LBS | HEART RATE: 92 BPM | TEMPERATURE: 98.4 F | SYSTOLIC BLOOD PRESSURE: 122 MMHG | HEIGHT: 63 IN | OXYGEN SATURATION: 96 % | BODY MASS INDEX: 30.65 KG/M2

## 2024-10-23 DIAGNOSIS — E56.9 VITAMIN DEFICIENCY: ICD-10-CM

## 2024-10-23 DIAGNOSIS — E78.1 HYPERTRIGLYCERIDEMIA: ICD-10-CM

## 2024-10-23 DIAGNOSIS — E53.9 VITAMIN B DEFICIENCY: ICD-10-CM

## 2024-10-23 DIAGNOSIS — Z00.00 ENCOUNTER FOR PREVENTIVE CARE: ICD-10-CM

## 2024-10-23 DIAGNOSIS — E55.9 VITAMIN D DEFICIENCY: ICD-10-CM

## 2024-10-23 PROCEDURE — 3078F DIAST BP <80 MM HG: CPT | Performed by: STUDENT IN AN ORGANIZED HEALTH CARE EDUCATION/TRAINING PROGRAM

## 2024-10-23 PROCEDURE — 3074F SYST BP LT 130 MM HG: CPT | Performed by: STUDENT IN AN ORGANIZED HEALTH CARE EDUCATION/TRAINING PROGRAM

## 2024-10-23 PROCEDURE — 99214 OFFICE O/P EST MOD 30 MIN: CPT | Performed by: STUDENT IN AN ORGANIZED HEALTH CARE EDUCATION/TRAINING PROGRAM

## 2024-10-23 ASSESSMENT — FIBROSIS 4 INDEX: FIB4 SCORE: 1.06

## 2024-10-28 ENCOUNTER — HOSPITAL ENCOUNTER (OUTPATIENT)
Dept: LAB | Facility: MEDICAL CENTER | Age: 54
End: 2024-10-28
Attending: STUDENT IN AN ORGANIZED HEALTH CARE EDUCATION/TRAINING PROGRAM
Payer: COMMERCIAL

## 2024-10-28 DIAGNOSIS — E56.9 VITAMIN DEFICIENCY: ICD-10-CM

## 2024-10-28 DIAGNOSIS — Z00.00 ENCOUNTER FOR PREVENTIVE CARE: ICD-10-CM

## 2024-10-28 DIAGNOSIS — E53.9 VITAMIN B DEFICIENCY: ICD-10-CM

## 2024-10-28 DIAGNOSIS — E55.9 VITAMIN D DEFICIENCY: ICD-10-CM

## 2024-10-28 DIAGNOSIS — E78.1 HYPERTRIGLYCERIDEMIA: ICD-10-CM

## 2024-10-28 LAB
25(OH)D3 SERPL-MCNC: 31 NG/ML (ref 30–100)
ALBUMIN SERPL BCP-MCNC: 4.6 G/DL (ref 3.2–4.9)
ALBUMIN/GLOB SERPL: 1.6 G/DL
ALP SERPL-CCNC: 81 U/L (ref 30–99)
ALT SERPL-CCNC: 21 U/L (ref 2–50)
ANION GAP SERPL CALC-SCNC: 11 MMOL/L (ref 7–16)
AST SERPL-CCNC: 21 U/L (ref 12–45)
BASOPHILS # BLD AUTO: 0.5 % (ref 0–1.8)
BASOPHILS # BLD: 0.02 K/UL (ref 0–0.12)
BILIRUB SERPL-MCNC: 0.8 MG/DL (ref 0.1–1.5)
BUN SERPL-MCNC: 11 MG/DL (ref 8–22)
CALCIUM ALBUM COR SERPL-MCNC: 9 MG/DL (ref 8.5–10.5)
CALCIUM SERPL-MCNC: 9.5 MG/DL (ref 8.4–10.2)
CHLORIDE SERPL-SCNC: 101 MMOL/L (ref 96–112)
CHOLEST SERPL-MCNC: 192 MG/DL (ref 100–199)
CO2 SERPL-SCNC: 25 MMOL/L (ref 20–33)
CREAT SERPL-MCNC: 0.91 MG/DL (ref 0.5–1.4)
EOSINOPHIL # BLD AUTO: 0.13 K/UL (ref 0–0.51)
EOSINOPHIL NFR BLD: 3 % (ref 0–6.9)
ERYTHROCYTE [DISTWIDTH] IN BLOOD BY AUTOMATED COUNT: 40 FL (ref 35.9–50)
EST. AVERAGE GLUCOSE BLD GHB EST-MCNC: 100 MG/DL
FASTING STATUS PATIENT QL REPORTED: NORMAL
FOLATE SERPL-MCNC: 18.3 NG/ML
GFR SERPLBLD CREATININE-BSD FMLA CKD-EPI: 75 ML/MIN/1.73 M 2
GLOBULIN SER CALC-MCNC: 2.8 G/DL (ref 1.9–3.5)
GLUCOSE SERPL-MCNC: 99 MG/DL (ref 65–99)
HBA1C MFR BLD: 5.1 % (ref 4–5.6)
HCT VFR BLD AUTO: 45.4 % (ref 37–47)
HDLC SERPL-MCNC: 49 MG/DL
HGB BLD-MCNC: 15.5 G/DL (ref 12–16)
IMM GRANULOCYTES # BLD AUTO: 0.01 K/UL (ref 0–0.11)
IMM GRANULOCYTES NFR BLD AUTO: 0.2 % (ref 0–0.9)
LDLC SERPL CALC-MCNC: 110 MG/DL
LYMPHOCYTES # BLD AUTO: 1.28 K/UL (ref 1–4.8)
LYMPHOCYTES NFR BLD: 29.8 % (ref 22–41)
MCH RBC QN AUTO: 30.5 PG (ref 27–33)
MCHC RBC AUTO-ENTMCNC: 34.1 G/DL (ref 32.2–35.5)
MCV RBC AUTO: 89.2 FL (ref 81.4–97.8)
MONOCYTES # BLD AUTO: 0.34 K/UL (ref 0–0.85)
MONOCYTES NFR BLD AUTO: 7.9 % (ref 0–13.4)
NEUTROPHILS # BLD AUTO: 2.52 K/UL (ref 1.82–7.42)
NEUTROPHILS NFR BLD: 58.6 % (ref 44–72)
NRBC # BLD AUTO: 0 K/UL
NRBC BLD-RTO: 0 /100 WBC (ref 0–0.2)
PLATELET # BLD AUTO: 262 K/UL (ref 164–446)
PMV BLD AUTO: 9.5 FL (ref 9–12.9)
POTASSIUM SERPL-SCNC: 4 MMOL/L (ref 3.6–5.5)
PROT SERPL-MCNC: 7.4 G/DL (ref 6–8.2)
RBC # BLD AUTO: 5.09 M/UL (ref 4.2–5.4)
SODIUM SERPL-SCNC: 137 MMOL/L (ref 135–145)
TRIGL SERPL-MCNC: 167 MG/DL (ref 0–149)
TSH SERPL DL<=0.005 MIU/L-ACNC: 2.02 UIU/ML (ref 0.38–5.33)
VIT B12 SERPL-MCNC: 584 PG/ML (ref 211–911)
WBC # BLD AUTO: 4.3 K/UL (ref 4.8–10.8)

## 2024-10-28 PROCEDURE — 85025 COMPLETE CBC W/AUTO DIFF WBC: CPT

## 2024-10-28 PROCEDURE — 84207 ASSAY OF VITAMIN B-6: CPT

## 2024-10-28 PROCEDURE — 80061 LIPID PANEL: CPT

## 2024-10-28 PROCEDURE — 83036 HEMOGLOBIN GLYCOSYLATED A1C: CPT

## 2024-10-28 PROCEDURE — 82746 ASSAY OF FOLIC ACID SERUM: CPT

## 2024-10-28 PROCEDURE — 84443 ASSAY THYROID STIM HORMONE: CPT

## 2024-10-28 PROCEDURE — 80053 COMPREHEN METABOLIC PANEL: CPT

## 2024-10-28 PROCEDURE — 82607 VITAMIN B-12: CPT

## 2024-10-28 PROCEDURE — 36415 COLL VENOUS BLD VENIPUNCTURE: CPT

## 2024-10-28 PROCEDURE — 84591 ASSAY OF NOS VITAMIN: CPT

## 2024-10-28 PROCEDURE — 84425 ASSAY OF VITAMIN B-1: CPT

## 2024-10-28 PROCEDURE — 82306 VITAMIN D 25 HYDROXY: CPT

## 2024-10-30 LAB — VIT B1 BLD-MCNC: 105 NMOL/L (ref 70–180)

## 2024-10-31 LAB — VIT B6 SERPL-MCNC: 55.3 NMOL/L (ref 20–125)

## 2024-11-01 LAB
NIACIN SERPL-MCNC: NORMAL NG/ML
NICOTINAMIDE SERPL-MCNC: 52 NG/ML
NICOTINURATE SERPL-MCNC: NORMAL NG/ML

## 2024-11-25 ENCOUNTER — OFFICE VISIT (OUTPATIENT)
Dept: MEDICAL GROUP | Facility: MEDICAL CENTER | Age: 54
End: 2024-11-25
Payer: COMMERCIAL

## 2024-11-25 VITALS
TEMPERATURE: 98.6 F | HEIGHT: 64 IN | DIASTOLIC BLOOD PRESSURE: 74 MMHG | HEART RATE: 89 BPM | BODY MASS INDEX: 30.01 KG/M2 | OXYGEN SATURATION: 97 % | SYSTOLIC BLOOD PRESSURE: 130 MMHG | WEIGHT: 175.8 LBS

## 2024-11-25 DIAGNOSIS — H65.02 NON-RECURRENT ACUTE SEROUS OTITIS MEDIA OF LEFT EAR: ICD-10-CM

## 2024-11-25 DIAGNOSIS — H65.92 FLUID LEVEL BEHIND TYMPANIC MEMBRANE OF LEFT EAR: ICD-10-CM

## 2024-11-25 PROCEDURE — 3078F DIAST BP <80 MM HG: CPT | Performed by: INTERNAL MEDICINE

## 2024-11-25 PROCEDURE — 99213 OFFICE O/P EST LOW 20 MIN: CPT | Performed by: INTERNAL MEDICINE

## 2024-11-25 PROCEDURE — 3075F SYST BP GE 130 - 139MM HG: CPT | Performed by: INTERNAL MEDICINE

## 2024-11-25 RX ORDER — AZELASTINE 1 MG/ML
1 SPRAY, METERED NASAL 2 TIMES DAILY
Qty: 30 ML | Refills: 1 | Status: SHIPPED | OUTPATIENT
Start: 2024-11-25

## 2024-11-25 ASSESSMENT — ENCOUNTER SYMPTOMS
STRIDOR: 0
CHILLS: 0
FEVER: 0
SHORTNESS OF BREATH: 0
SORE THROAT: 0
SINUS PAIN: 0

## 2024-11-25 ASSESSMENT — FIBROSIS 4 INDEX: FIB4 SCORE: 0.94

## 2024-11-26 NOTE — PROGRESS NOTES
"CC:   Chief Complaint   Patient presents with    Ear Injury     She's been having the pain in her ear x 2 days      Diagnoses of Non-recurrent acute serous otitis media of left ear and Fluid level behind tympanic membrane of left ear were pertinent to this visit.  Verbal consent was acquired by the patient to use i[employer] ambient listening note generation during this visit Yes     History of Present Illness  Enriqueta  is a pleasant 54-year-old female presenting for evaluation of ear pain.    She reports experiencing pain in her left ear, which she describes as different from her previous experience with boils in the ear. She has not had any middle ear infections or been prescribed antibiotics for such conditions.She has not taken any medication for her current symptoms.    She reports no fever, chills, or headaches.    She recently visited [facility] over the weekend.    She has previously taken Benadryl and Claritin, but avoids Zyrtec due to its sedative effects. She also uses Xlear nasal spray.    Past Medical History:   Diagnosis Date    Abnormal Pap smear of vagina     Fibroids        Current Outpatient Medications Ordered in Epic   Medication Sig Dispense Refill    amoxicillin-clavulanate (AUGMENTIN) 875-125 MG Tab Take 1 Tablet by mouth 2 times a day. 14 Tablet 0    azelastine (ASTELIN) 137 MCG/SPRAY nasal spray Administer 1 Spray into affected nostril(S) 2 times a day. 30 mL 1     No current [facility]-ordered facility-administered medications on file.     Review of Systems   Constitutional:  Negative for chills and fever.   HENT:  Positive for ear pain. Negative for congestion, hearing loss, sinus pain and sore throat.    Respiratory:  Negative for shortness of breath and stridor.    Cardiovascular:  Negative for chest pain.     Objective:     Exam:  /74 (BP Location: Left arm, Patient Position: Sitting, BP Cuff Size: Adult)   Pulse 89   Temp 37 °C (98.6 °F) (Temporal)   Ht 1.615 m (5' 3.6\")   " Wt 79.7 kg (175 lb 12.8 oz)   SpO2 97%   BMI 30.56 kg/m²  Body mass index is 30.56 kg/m².    Physical Exam  Constitutional:       Appearance: Normal appearance.   HENT:      Head: Normocephalic and atraumatic.      Left Ear: A middle ear effusion is present.   Cardiovascular:      Pulses: Normal pulses.      Heart sounds: Normal heart sounds. No murmur heard.  Pulmonary:      Effort: Pulmonary effort is normal. No respiratory distress.   Neurological:      General: No focal deficit present.      Mental Status: She is alert and oriented to person, place, and time.   Psychiatric:         Mood and Affect: Mood normal.         Behavior: Behavior normal.         Thought Content: Thought content normal.         Judgment: Judgment normal.         Assessment & Plan:   Enriqueta  is a pleasant 54 y.o. female with the following -   Assessment & Plan  1. Acute otitis media, left.  A 7-day course of Augmentin was prescribed. She was advised to consume yogurt, kefir, or fermented vegetables while on antibiotics to maintain  bifidobacteria. Allegra was recommended for daytime use over the next week to alleviate ear swelling. Azelastine nasal spray was prescribed to facilitate ear fluid drainage. She was also advised to maintain a daily water intake of 64 ounces and to take vitamin C and D supplements. If there is no improvement within 72 hours, she should seek immediate medical attention either at this facility or at an urgent care center.      Problem List Items Addressed This Visit    None  Visit Diagnoses       Non-recurrent acute serous otitis media of left ear        Relevant Medications    amoxicillin-clavulanate (AUGMENTIN) 875-125 MG Tab    Fluid level behind tympanic membrane of left ear        Relevant Medications    azelastine (ASTELIN) 137 MCG/SPRAY nasal spray          Return if symptoms worsen or fail to improve.    Please note that this dictation was created using voice recognition software. I have made every  reasonable attempt to correct obvious errors, but I expect that there are errors of grammar and possibly content that I did not discover before finalizing the note.

## 2025-04-03 ENCOUNTER — OFFICE VISIT (OUTPATIENT)
Dept: MEDICAL GROUP | Facility: MEDICAL CENTER | Age: 55
End: 2025-04-03
Payer: COMMERCIAL

## 2025-04-03 VITALS
SYSTOLIC BLOOD PRESSURE: 116 MMHG | HEART RATE: 78 BPM | WEIGHT: 166 LBS | HEIGHT: 64 IN | BODY MASS INDEX: 28.34 KG/M2 | TEMPERATURE: 96.9 F | OXYGEN SATURATION: 97 % | DIASTOLIC BLOOD PRESSURE: 72 MMHG

## 2025-04-03 DIAGNOSIS — Z00.00 ANNUAL PHYSICAL EXAM: ICD-10-CM

## 2025-04-03 PROCEDURE — 3074F SYST BP LT 130 MM HG: CPT | Performed by: STUDENT IN AN ORGANIZED HEALTH CARE EDUCATION/TRAINING PROGRAM

## 2025-04-03 PROCEDURE — 3078F DIAST BP <80 MM HG: CPT | Performed by: STUDENT IN AN ORGANIZED HEALTH CARE EDUCATION/TRAINING PROGRAM

## 2025-04-03 PROCEDURE — 99396 PREV VISIT EST AGE 40-64: CPT | Performed by: STUDENT IN AN ORGANIZED HEALTH CARE EDUCATION/TRAINING PROGRAM

## 2025-04-03 ASSESSMENT — FIBROSIS 4 INDEX: FIB4 SCORE: 0.94

## 2025-04-03 ASSESSMENT — PATIENT HEALTH QUESTIONNAIRE - PHQ9: CLINICAL INTERPRETATION OF PHQ2 SCORE: 0

## 2025-04-03 NOTE — PROGRESS NOTES
Subjective:     CC:   Chief Complaint   Patient presents with    Annual Exam    Requesting Labs       HPI:   Enriqueta Valdovinos is a 54 y.o. female who presents for annual exam. She is feeling well and denies any complaints.    Ob-Gyn/ History:    Patient has GYN provider: yes  Up-to-date with Pap smear    Cancer screening  Colorectal Cancer Screening: Up-to-date   Lung Cancer Screening: N/A   Cervical Cancer Screening: Up-to-date   Breast Cancer Screening: N/A     Infectious disease screening/Immunizations  Discussed shingles vaccine, hepatitis B vaccine, and pneumonia vaccine    She  has a past medical history of Abnormal Pap smear of vagina and Fibroids.  She  has a past surgical history that includes endometrial ablation (2018).    Family History   Problem Relation Age of Onset    Anxiety disorder Mother     Other Mother         PAD with stent placement    Cancer Father         bladder, hx of smoking    Hypertension Father     Hyperlipidemia Sister     Breast Cancer Sister     Obesity Daughter        Social History     Socioeconomic History    Marital status:      Spouse name: Eriberto    Number of children: 1    Years of education: Not on file    Highest education level: Not on file   Occupational History    Not on file   Tobacco Use    Smoking status: Never    Smokeless tobacco: Never   Vaping Use    Vaping status: Never Used   Substance and Sexual Activity    Alcohol use: Yes     Comment: once a year    Drug use: Never    Sexual activity: Yes     Partners: Male     Birth control/protection: Post-Menopausal     Comment:    Other Topics Concern    Not on file   Social History Narrative    Not on file     Social Drivers of Health     Financial Resource Strain: Unknown (7/26/2022)    Overall Financial Resource Strain (CARDIA)     Difficulty of Paying Living Expenses: Patient declined   Food Insecurity: Unknown (7/26/2022)    Hunger Vital Sign     Worried About Running Out of Food in the Last Year:  Patient declined     Ran Out of Food in the Last Year: Patient declined   Transportation Needs: Unknown (7/26/2022)    PRAPARE - Transportation     Lack of Transportation (Medical): Patient declined     Lack of Transportation (Non-Medical): Patient declined   Physical Activity: Sufficiently Active (7/26/2022)    Exercise Vital Sign     Days of Exercise per Week: 1 day     Minutes of Exercise per Session: 150+ min   Stress: No Stress Concern Present (7/26/2022)    [employer] [place] [employer] - Occupational Stress Questionnaire     Feeling of Stress : Not at all   Social Connections: Unknown (7/26/2022)    Social Connection and Isolation Panel [NHANES]     Frequency of Communication with Friends and Family: Patient declined     Frequency of Social Gatherings with Friends and Family: Patient declined     Attends Jew Services: Patient declined     Active Member of Clubs or Organizations: Patient declined     Attends Club or Organization Meetings: Patient declined     Marital Status: Patient declined   Intimate Partner Violence: Not on file   Housing Stability: Unknown (7/26/2022)    Housing Stability Vital Sign     Unable to Pay for Housing in the Last Year: Patient refused     Number of Places Lived in the Last Year: Not on file     Unstable Housing in the Last Year: Patient refused       Patient Active Problem List    Diagnosis Date Noted    Plantar fasciitis 03/26/2024    Iron deficiency 03/26/2024    Throat discomfort 08/15/2023    COVID-19 10/05/2022    BMI 30.0-30.9,adult 07/27/2022    Alternating constipation and diarrhea 07/27/2022    Decreased libido 07/27/2022    Preventative health care 07/26/2022    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     No current facility-administered medications for this visit.     No Active Allergies      Objective:     /72 (BP Location: Left arm, Patient Position: Sitting, BP Cuff Size: Adult)   Pulse 78   Temp 36.1 °C (96.9 °F) (Temporal)   Ht 1.619 m (5' 3.75\")   Wt 75.3 kg (166 lb)   SpO2 97%   BMI 28.72 kg/m²   Body mass index is 28.72 kg/m².  Wt Readings from Last 4 Encounters:   04/03/25 75.3 kg (166 lb)   11/25/24 79.7 kg (175 lb 12.8 oz)   10/23/24 78.5 kg (173 lb)   08/21/24 77.1 kg (170 lb)       Physical Exam:  Constitutional: Well-developed and well-nourished. Not diaphoretic. No distress.   Skin: Skin is warm and dry. No rash noted.  Head: Atraumatic without lesions.  Eyes: Conjunctivae and extraocular motions are normal. Pupils are equal, round, and reactive to light. No scleral icterus.   Ears:  External ears unremarkable. Tympanic membranes clear and intact.  Nose: Nares patent. Septum midline. Turbinates without erythema nor edema. No discharge.   Mouth/Throat: Dentition is normal. Tongue normal. Oropharynx is clear and moist. Posterior pharynx without erythema or exudates.  Neck: Supple, trachea midline. Normal range of motion.   Cardiovascular: Regular rate and rhythm, S1 and S2 without murmur, rubs, or gallops.  Lungs: Normal inspiratory effort, CTA bilaterally, no wheezes/rhonchi/rales  Abdomen: Soft, non tender, and without distention. Active bowel sounds in all four quadrants. No rebound, guarding, masses or HSM.  Extremities: No cyanosis, clubbing, erythema, nor edema.   Musculoskeletal: All major joints AROM full in all directions without pain.  Psychiatric:  Behavior, mood, and affect are appropriate.      Assessment and Plan:     1. Annual physical exam          Discussed routine screening and vaccinations, reviewed " lab work    Follow-up: No follow-ups on file.

## 2025-05-20 ENCOUNTER — APPOINTMENT (OUTPATIENT)
Dept: MEDICAL GROUP | Facility: MEDICAL CENTER | Age: 55
End: 2025-05-20
Payer: COMMERCIAL